# Patient Record
Sex: FEMALE | Race: WHITE | NOT HISPANIC OR LATINO | Employment: UNEMPLOYED | URBAN - METROPOLITAN AREA
[De-identification: names, ages, dates, MRNs, and addresses within clinical notes are randomized per-mention and may not be internally consistent; named-entity substitution may affect disease eponyms.]

---

## 2017-01-12 RX ORDER — METOPROLOL TARTRATE 50 MG/1
TABLET, FILM COATED ORAL
Qty: 90 TAB | Refills: 0 | Status: SHIPPED | OUTPATIENT
Start: 2017-01-12 | End: 2017-02-08 | Stop reason: SDUPTHER

## 2017-01-12 NOTE — TELEPHONE ENCOUNTER
Last seen: 05/17/16 by Dr. velazquez  Next appt: 01/13/17 with Dr. Hernández    Was the patient seen in the last year in this department? Yes   Does patient have an active prescription for medications requested? No   Received Request Via: Pharmacy

## 2017-01-13 ENCOUNTER — OFFICE VISIT (OUTPATIENT)
Dept: INTERNAL MEDICINE | Facility: MEDICAL CENTER | Age: 52
End: 2017-01-13
Payer: MEDICARE

## 2017-01-13 VITALS
OXYGEN SATURATION: 97 % | DIASTOLIC BLOOD PRESSURE: 82 MMHG | TEMPERATURE: 99 F | HEIGHT: 66 IN | HEART RATE: 52 BPM | BODY MASS INDEX: 25.65 KG/M2 | RESPIRATION RATE: 16 BRPM | SYSTOLIC BLOOD PRESSURE: 102 MMHG | WEIGHT: 159.6 LBS

## 2017-01-13 DIAGNOSIS — Z00.00 HEALTH CARE MAINTENANCE: ICD-10-CM

## 2017-01-13 DIAGNOSIS — I10 ESSENTIAL HYPERTENSION: ICD-10-CM

## 2017-01-13 DIAGNOSIS — M79.7 FIBROMYALGIA: ICD-10-CM

## 2017-01-13 DIAGNOSIS — Z72.0 TOBACCO ABUSE: ICD-10-CM

## 2017-01-13 DIAGNOSIS — M25.50 POLYARTHRALGIA: ICD-10-CM

## 2017-01-13 DIAGNOSIS — E78.00 HYPERCHOLESTEROLEMIA: ICD-10-CM

## 2017-01-13 PROCEDURE — 99213 OFFICE O/P EST LOW 20 MIN: CPT | Mod: GE | Performed by: INTERNAL MEDICINE

## 2017-01-13 RX ORDER — MELOXICAM 15 MG/1
TABLET ORAL
Refills: 2 | COMMUNITY
Start: 2017-01-04 | End: 2021-03-13

## 2017-01-13 NOTE — MR AVS SNAPSHOT
"        Cheyanne Lindsey   2017 8:15 AM   Office Visit   MRN: [id number]    Department:  [facility] Med - Internal Med   Dept Phone:  [phone]    Description:  Female : 1965   Provider:  Henrique Rose M.D.           Reason for Visit     Hyperlipidemia Dr. Pinzon pt- wishes lab order, off statin x 7 months     Hypertension           Allergies as of 2017     No Known Allergies      You were diagnosed with     Hypercholesterolemia   [790119]       Essential hypertension   [8741738]       Polyarthralgia   [964624]       Fibromyalgia   [080291]       Tobacco abuse   [541870]       Health care maintenance   [517741]         Vital Signs     Blood Pressure Pulse Temperature Respirations Height Weight    102/82 mmHg 52 37.2 °C (99 °F) 16 1.683 m (5' 6.26\") 72.394 kg (159 lb 9.6 oz)    Body Mass Index Oxygen Saturation Smoking Status             25.56 kg/m2 97% Current Every Day Smoker         Basic Information     Date Of Birth Sex Race Ethnicity Preferred Language    1965 Female White Non- English      Your appointments     2017  8:00 AM   Established Patient with Jayla Pinzon M.D.   [facility] Med - Internal Medicine (--)    [address] [place] [id number] [phone]           You will be receiving a confirmation call a few days before your appointment from our automated call confirmation system.              Problem List              ICD-10-CM Priority Class Noted - Resolved    Fibromyalgia M79.7   2016 - Present    Muscle cramp R25.2   2016 - Present    Blurred vision H53.8   2016 - Present    Palpitation R00.2   2016 - Present    URI (upper respiratory infection) J06.9   2016 - Present    BPPV (benign paroxysmal positional vertigo) H81.10   2016 - Present    Tobacco abuse Z72.0   2016 - Present    Abnormal vaginal Pap smear R87.629   2016 - Present    Lightheadedness R42   2016 - Present   " Hypercholesterolemia E78.00   5/19/2016 - Present    Chest pain R07.9   5/19/2016 - Present    Constipation K59.00   5/19/2016 - Present    Impaired fasting glucose R73.01   5/19/2016 - Present    Vaginal discharge N89.8   5/19/2016 - Present    Chronic low back pain M54.5, G89.29   5/19/2016 - Present    RLS (restless legs syndrome) G25.81   5/19/2016 - Present    Polyarthralgia M25.50   5/19/2016 - Present      Health Maintenance        Date Due Completion Dates    IMM DTaP/Tdap/Td Vaccine (1 - Tdap) 1/2/1983 ---    IMM PNEUMOCOCCAL 19-64 (ADULT) MEDIUM RISK SERIES (1 of 1 - PPSV23) 1/2/1983 ---    PAP SMEAR 1/2/1985 ---    MAMMOGRAM 2/4/2005 2/4/2004    COLONOSCOPY 1/2/2014 ---    IMM INFLUENZA (1) 9/1/2016 12/17/2015            Current Immunizations     Influenza Vaccine Quad Inj (Pf) 12/17/2015      Below and/or attached are the medications your provider expects you to take. Review all of your home medications and newly ordered medications with your provider and/or pharmacist. Follow medication instructions as directed by your provider and/or pharmacist. Please keep your medication list with you and share with your provider. Update the information when medications are discontinued, doses are changed, or new medications (including over-the-counter products) are added; and carry medication information at all times in the event of emergency situations     Allergies:  No Known Allergies          Medications  Valid as of: January 13, 2017 -  9:04 AM    Generic Name Brand Name Tablet Size Instructions for use    Acetaminophen (Tab) TYLENOL 500 MG Take 500-1,000 mg by mouth every 6 hours as needed.        Albuterol Sulfate (Aero Soln) albuterol 108 (90 BASE) MCG/ACT Inhale 2 Puffs by mouth every 6 hours as needed for Shortness of Breath.        Amoxicillin-Pot Clavulanate (Tab) AUGMENTIN 875-125 MG Take 1 Tab by mouth 2 times a day.        Atorvastatin Calcium (Tab) LIPITOR 40 MG Take 40 mg by mouth every evening.         Azithromycin (Tab) ZITHROMAX 250 MG 2 tablets by mouth on day one then one tablet by mouth daily for the next 4 days.        Benzonatate (Cap) TESSALON 100 MG Take 1 Cap by mouth 3 times a day as needed for Cough.        Ciprofloxacin-Dexamethasone (Suspension) CIPRODEX 0.3-0.1 % Place 4 Drops in ear 2 times a day.        Cyclobenzaprine HCl (Tab) FLEXERIL 5 MG Take 5-10 mg by mouth 3 times a day as needed.        GuaiFENesin (TABLET SR 12 HR) MUCINEX 600 MG Take 600 mg by mouth every 12 hours.        Guaifenesin-Codeine (Solution) ROBITUSSIN -10 mg/5mL Take 5 mL by mouth every four hours as needed for Cough.        Hydrocodone-Acetaminophen (Tab) NORCO 7.5-325 MG Take 1-2 Tabs by mouth every 6 hours as needed.        Hydrocodone-Acetaminophen (Tab) NORCO  MG Take 1-2 Tabs by mouth every 6 hours as needed.        Ibuprofen   Take 800 mg by mouth 3 times a day as needed. OTC        Meloxicam (Tab) MOBIC 15 MG TK 1 T PO QD        Metoprolol Tartrate (Tab) LOPRESSOR 50 MG TAKE 1 1/2 TABLETS BY MOUTH TWICE DAILY        Mupirocin (Ointment) BACTROBAN 2 % Apply  to affected area(s) every day.        .                 Medicines prescribed today were sent to:     [facility] [address] [place] [address] [address] [facility]    [address] [place] [id number]    Phone: [phone] Fax: [phone]    Open 24 Hours?: No      Medication refill instructions:       If your prescription bottle indicates you have medication refills left, it is not necessary to call your provider’s office. Please contact your pharmacy and they will refill your medication.    If your prescription bottle indicates you do not have any refills left, you may request refills at any time through one of the following ways: The online Slingjot system (except Urgent Care), by calling your provider’s office, or by asking your pharmacy to contact your provider’s office with a refill request. Medication refills are  processed only during regular business hours and may not be available until the next business day. Your provider may request additional information or to have a follow-up visit with you prior to refilling your medication.   *Please Note: Medication refills are assigned a new Rx number when refilled electronically. Your pharmacy may indicate that no refills were authorized even though a new prescription for the same medication is available at the pharmacy. Please request the medicine by name with the pharmacy before contacting your provider for a refill.        Your To Do List     Future Labs/Procedures Complete By Expires    ANTI-NUCLEAR ANTIBODY SERUM  As directed 1/13/2018    CBC WITHOUT DIFFERENTIAL  As directed 7/13/2017    COMP METABOLIC PANEL  As directed 1/13/2018    [id number]  As directed 1/13/2018    TSH WITH REFLEX TO FT4  As directed 1/13/2018      Referral     A referral request has been sent to our patient care coordination department. Please allow 3-5 business days for us to process this request and contact you either by phone or mail. If you do not hear from us by the 5th business day, please call us at [phone].        Instructions    - Get pneumovax 23 at pharmacy          [email or web address] Access Code: [email or web address]  Expires: 2/12/2017  9:04 AM    CompStak  A secure, online tool to manage your health information     [employer]’s CompStak® is a secure, online tool that connects you to your personalized health information from the privacy of your home -- day or night - making it very easy for you to manage your healthcare. Once the activation process is completed, you can even access your medical information using the CompStak kortney, which is available for free in the Apple Kortney store or Google Play store.     CompStak provides the following levels of access (as shown below):   My Chart Features   [facility] Doctor [facility]  Specialists [facility]  Urgent  Care Non-[facility]  Primary  Care  Doctor   Email your healthcare team securely and privately 24/7 X X X    Manage appointments: schedule your next appointment; view details of past/upcoming appointments X      Request prescription refills. X      View recent personal medical records, including lab and immunizations X X X X   View health record, including health history, allergies, medications X X X X   Read reports about your outpatient visits, procedures, consult and ER notes X X X X   See your discharge summary, which is a recap of your hospital and/or ER visit that includes your diagnosis, lab results, and care plan. X X       How to register for Anaergia:  1. Go to  [email or web address].  2. Click on the Sign Up Now box, which takes you to the New Member Sign Up page. You will need to provide the following information:  a. Enter your Anaergia Access Code exactly as it appears at the top of this page. (You will not need to use this code after you’ve completed the sign-up process. If you do not sign up before the expiration date, you must request a new code.)   b. Enter your date of birth.   c. Enter your home email address.   d. Click Submit, and follow the next screen’s instructions.  3. Create a Anaergia ID. This will be your Anaergia login ID and cannot be changed, so think of one that is secure and easy to remember.  4. Create a Anaergia password. You can change your password at any time.  5. Enter your Password Reset Question and Answer. This can be used at a later time if you forget your password.   6. Enter your e-mail address. This allows you to receive e-mail notifications when new information is available in Anaergia.  7. Click Sign Up. You can now view your health information.    For assistance activating your Anaergia account, call [phone]

## 2017-01-14 NOTE — PROGRESS NOTES
"Established Patient    Cheyanne presents today with the following:    CC: Off statin X 7 months requesting lipid panel, high BP    HPI: Miss Lindsey is a 52 year old female with a PMH of fibromyalgia, tobacco use, HLD, RLS, chronic low back pain, diffuse joint pain presents for an acute visit for elevated BP and is requesting a lipid panel. Roughly 7 months prior the patient was pulling weeds in her garden and using her hands a lot when she began to experience a worsening of her chronic hand pain. She was told by an acquaintance that this is a side effect of the statin that she was taking so she discontinued it. Patient is also worried about her blood pressure. Home reading have been running high lately reaching levels of 202/100. At the time that reading was taken she was in a significant amount of pain and also states that she had \"the flu\" with upper respiratory symptoms. She states that at present she is prescribed to take 1.5 pills of metoprolol, but has been taking 2 pills because of her elevated BP reading. She would also like the labs that were ordered for her in May reordered because she never had them performed. She is still in a significant amount of joint pain and body aches and is requesting a referral to a neurologist.    Patient Active Problem List    Diagnosis Date Noted   • Fibromyalgia 05/19/2016   • Muscle cramp 05/19/2016   • Blurred vision 05/19/2016   • Palpitation 05/19/2016   • URI (upper respiratory infection) 05/19/2016   • BPPV (benign paroxysmal positional vertigo) 05/19/2016   • Tobacco abuse 05/19/2016   • Abnormal vaginal Pap smear 05/19/2016   • Lightheadedness 05/19/2016   • Hypercholesterolemia 05/19/2016   • Chest pain 05/19/2016   • Constipation 05/19/2016   • Impaired fasting glucose 05/19/2016   • Vaginal discharge 05/19/2016   • Chronic low back pain 05/19/2016   • RLS (restless legs syndrome) 05/19/2016   • Polyarthralgia 05/19/2016       Current Outpatient Prescriptions "   Medication Sig Dispense Refill   • metoprolol (LOPRESSOR) 50 MG Tab TAKE 1 1/2 TABLETS BY MOUTH TWICE DAILY 90 Tab 0   • hydrocodone/acetaminophen (NORCO)  MG Tab Take 1-2 Tabs by mouth every 6 hours as needed.     • cyclobenzaprine (FLEXERIL) 5 MG tablet Take 5-10 mg by mouth 3 times a day as needed.     • atorvastatin (LIPITOR) 40 MG Tab Take 40 mg by mouth every evening.     • acetaminophen (TYLENOL) 500 MG Tab Take 500-1,000 mg by mouth every 6 hours as needed.     • meloxicam (MOBIC) 15 MG tablet TK 1 T PO QD  2   • ciprofloxacin/dexamethasone (CIPRODEX) 0.3-0.1 % Suspension Place 4 Drops in ear 2 times a day.     • mupirocin (BACTROBAN) 2 % Ointment Apply  to affected area(s) every day.     • guaifenesin LA (MUCINEX) 600 MG TABLET SR 12 HR Take 600 mg by mouth every 12 hours.     • hydrocodone-acetaminophen (NORCO) 7.5-325 MG per tablet Take 1-2 Tabs by mouth every 6 hours as needed.     • amoxicillin-clavulanate (AUGMENTIN) 875-125 MG Tab Take 1 Tab by mouth 2 times a day. (Patient not taking: Reported on 5/17/2016) 20 Tab 0   • guaifenesin-codeine (ROBITUSSIN AC) Solution oral solution Take 5 mL by mouth every four hours as needed for Cough. (Patient not taking: Reported on 5/17/2016) 140 mL 0   • azithromycin (ZITHROMAX Z-SHARAD) 250 MG Tab 2 tablets by mouth on day one then one tablet by mouth daily for the next 4 days. (Patient not taking: Reported on 5/17/2016) 6 Tab 0   • benzonatate (TESSALON) 100 MG Cap Take 1 Cap by mouth 3 times a day as needed for Cough. (Patient not taking: Reported on 5/17/2016) 20 Cap 0   • albuterol (VENTOLIN OR PROVENTIL) 108 (90 BASE) MCG/ACT AERS Inhale 2 Puffs by mouth every 6 hours as needed for Shortness of Breath. 1 Inhaler 1   • IBUPROFEN IB PO Take 800 mg by mouth 3 times a day as needed. OTC       No current facility-administered medications for this visit.       ROS: As per HPI. Additional pertinent symptoms as noted below.        /82 mmHg  Pulse 52   "Temp([facility]) 37.2 °C (99 °F)  Resp 16  Ht 1.683 m (5' 6.26\")  Wt 72.394 kg (159 lb 9.6 oz)  BMI 25.56 kg/m2  SpO2 97%    Physical Exam   Constitutional:  oriented to person, place, and time. No distress.    Eyes: Pupils are equal, round, and reactive to light. No scleral icterus.    Neck: Neck supple. No thyromegaly present.    Cardiovascular: Normal rate, regular rhythm and normal heart sounds.  Exam reveals no gallop and no friction rub.     No murmur heard.  Pulmonary/Chest: Breath sounds normal. No wheezes, crackles, rhales. Chest wall is not dull to percussion.    Musculoskeletal:   no edema. No thenar atrophy  Lymphadenopathy: no cervical adenopathy  Neurological: alert and oriented to person, place, and time. CN 2-12 intact. DTR WNL.   Skin: No cyanosis. Nails show no clubbing.        Assessment and Plan    1. Hypercholesterolemia   - Reassured patient that although myositis and myalgia is a side effect of statin's, it is unlikely that it would only affect her hands   - Off statin X 7 months   - Repeat lipid panel   - May restart statin based on lab findings    2. Essential hypertension   - /100 at home according to patient   - /82, pulse 52 at today's visit. Denies lightheadedness, dizziness, LOC, weakness, fatigue at todays visit.   - Patient increased her metoprolol dose without physician knowledge   - Instructed to take her metoprolol as directed on bottle   - Given BP log. Asked to log BP with home cuff once per day and bring it in during her next office visit   - She was also asked to bring in her BP cuff    3. Polyarthralgia   - Rheumatoid factor negative in 2010   - Rh factor was again ordered this past May, but patient did not have it drawn   - No need to repeat lab at this visit   - She has been followed by a rheumatologist in the past   - AMBROCIO re-ordered    4. Fibromyalgia/Chronic pain   - Seen and managed at pain clinic who is prescribing her narcotics   - Requesting neurology consult " for hyperalgesia. Consult placed.    5. Tobacco abuse   - Counseled cessation   - Follow up at next visit    6. Health care maintenance   - Mammogram ordered   - Recommened PPSV23 at todays visit. She wanted to discuss it with her PCP.    PLAN: CBC, CMP, TSH, lipid panel, AMBROCIO, mammogram ordered. F/u with PCP, Dr. Pinzon on 2/24/2017.    Signed by: Henrique Rose M.D.

## 2017-02-09 RX ORDER — METOPROLOL TARTRATE 50 MG/1
TABLET, FILM COATED ORAL
Qty: 90 TAB | Refills: 0 | Status: SHIPPED | OUTPATIENT
Start: 2017-02-09 | End: 2017-03-20 | Stop reason: SDUPTHER

## 2017-02-09 NOTE — TELEPHONE ENCOUNTER
Last seen: 01/13/17 by Dr. Rose  Next appt: 02/24/17 with Dr. Pinzon    Was the patient seen in the last year in this department? Yes   Does patient have an active prescription for medications requested? No   Received Request Via: Pharmacy

## 2017-02-22 LAB
ALBUMIN SERPL-MCNC: 4.3 G/DL (ref 3.5–5.5)
ALBUMIN/GLOB SERPL: 1.9 {RATIO} (ref 1.1–2.5)
ALP SERPL-CCNC: 64 IU/L (ref 39–117)
ALT SERPL-CCNC: 15 IU/L (ref 0–32)
ANA HOMOGEN TITR SER: NORMAL {TITER}
ANA INTERCELL BRIDGE TITR SER IF: NORMAL {TITER}
ANA NUCLEAR DOTS TITR SER IF: NORMAL {TITER}
ANA NUCLEAR MEMBRANE PORES TITR SER IF: NORMAL {TITER}
ANA NUCLEOLAR TITR SER: NORMAL {TITER}
ANA SPECKLED TITR SER: NORMAL {TITER}
ANA TITR SER IF: POSITIVE {TITER}
AST SERPL-CCNC: 21 IU/L (ref 0–40)
BILIRUB SERPL-MCNC: 0.3 MG/DL (ref 0–1.2)
BUN SERPL-MCNC: 9 MG/DL (ref 6–24)
BUN/CREAT SERPL: 17 (ref 9–23)
CALCIUM SERPL-MCNC: 9.8 MG/DL (ref 8.7–10.2)
CENTROMERE AB TITR SER IF: NORMAL {TITER}
CHLORIDE SERPL-SCNC: 103 MMOL/L (ref 96–106)
CO2 SERPL-SCNC: 22 MMOL/L (ref 18–29)
CREAT SERPL-MCNC: 0.53 MG/DL (ref 0.57–1)
DEPRECATED CENTRIOLE AB TITR SER IF: NORMAL {TITER}
ENA PCNA AB TITR SER IF: NORMAL {TITER}
ERYTHROCYTE [DISTWIDTH] IN BLOOD BY AUTOMATED COUNT: 13.1 % (ref 12.3–15.4)
GLOBULIN SER CALC-MCNC: 2.3 G/DL (ref 1.5–4.5)
GLUCOSE SERPL-MCNC: 118 MG/DL (ref 65–99)
HCT VFR BLD AUTO: 46.2 % (ref 34–46.6)
HGB BLD-MCNC: 15.7 G/DL (ref 11.1–15.9)
MCH RBC QN AUTO: 32.2 PG (ref 26.6–33)
MCHC RBC AUTO-ENTMCNC: 34 G/DL (ref 31.5–35.7)
MCV RBC AUTO: 95 FL (ref 79–97)
MITOTIC SPINDLE APP AB TITR SERPL IF: NORMAL {TITER}
NOTE 016271: NORMAL
NRBC BLD AUTO-RTO: NORMAL %
PLATELET # BLD AUTO: 283 X10E3/UL (ref 150–379)
POTASSIUM SERPL-SCNC: 4.4 MMOL/L (ref 3.5–5.2)
PROT SERPL-MCNC: 6.6 G/DL (ref 6–8.5)
RBC # BLD AUTO: 4.87 X10E6/UL (ref 3.77–5.28)
SODIUM SERPL-SCNC: 144 MMOL/L (ref 134–144)
TSH SERPL DL<=0.005 MIU/L-ACNC: 1.94 UIU/ML (ref 0.45–4.5)
WBC # BLD AUTO: 9.1 X10E3/UL (ref 3.4–10.8)

## 2017-02-24 ENCOUNTER — OFFICE VISIT (OUTPATIENT)
Dept: INTERNAL MEDICINE | Facility: MEDICAL CENTER | Age: 52
End: 2017-02-24
Payer: MEDICARE

## 2017-02-24 VITALS
SYSTOLIC BLOOD PRESSURE: 136 MMHG | BODY MASS INDEX: 24.75 KG/M2 | DIASTOLIC BLOOD PRESSURE: 82 MMHG | HEIGHT: 66 IN | HEART RATE: 73 BPM | OXYGEN SATURATION: 95 % | WEIGHT: 154 LBS | TEMPERATURE: 98.4 F

## 2017-02-24 DIAGNOSIS — G89.29 CHRONIC MIDLINE LOW BACK PAIN, WITH SCIATICA PRESENCE UNSPECIFIED: ICD-10-CM

## 2017-02-24 DIAGNOSIS — Z00.00 HEALTHCARE MAINTENANCE: ICD-10-CM

## 2017-02-24 DIAGNOSIS — E78.00 HYPERCHOLESTEROLEMIA: ICD-10-CM

## 2017-02-24 DIAGNOSIS — M25.50 POLYARTHRALGIA: ICD-10-CM

## 2017-02-24 DIAGNOSIS — Z72.0 TOBACCO ABUSE: ICD-10-CM

## 2017-02-24 DIAGNOSIS — M79.7 FIBROMYALGIA: ICD-10-CM

## 2017-02-24 DIAGNOSIS — F32.0 MILD SINGLE CURRENT EPISODE OF MAJOR DEPRESSIVE DISORDER (HCC): ICD-10-CM

## 2017-02-24 DIAGNOSIS — M79.10 MUSCLE PAIN: ICD-10-CM

## 2017-02-24 DIAGNOSIS — J01.01 ACUTE RECURRENT MAXILLARY SINUSITIS: ICD-10-CM

## 2017-02-24 DIAGNOSIS — M54.5 CHRONIC MIDLINE LOW BACK PAIN, WITH SCIATICA PRESENCE UNSPECIFIED: ICD-10-CM

## 2017-02-24 PROCEDURE — 99214 OFFICE O/P EST MOD 30 MIN: CPT | Mod: GC | Performed by: INTERNAL MEDICINE

## 2017-02-24 RX ORDER — GABAPENTIN 300 MG/1
CAPSULE ORAL
COMMUNITY
Start: 2017-02-07 | End: 2017-02-24

## 2017-02-24 RX ORDER — DULOXETIN HYDROCHLORIDE 30 MG/1
30 CAPSULE, DELAYED RELEASE ORAL DAILY
Qty: 30 CAP | Refills: 6 | Status: SHIPPED | OUTPATIENT
Start: 2017-02-24 | End: 2017-03-31 | Stop reason: SDUPTHER

## 2017-02-24 RX ORDER — DOXYCYCLINE HYCLATE 100 MG
100 TABLET ORAL 2 TIMES DAILY
Qty: 10 TAB | Refills: 0 | Status: SHIPPED | OUTPATIENT
Start: 2017-02-24 | End: 2017-03-31

## 2017-02-24 RX ORDER — ZOLPIDEM TARTRATE 10 MG/1
TABLET ORAL
COMMUNITY
Start: 2017-02-07 | End: 2017-02-24

## 2017-02-24 ASSESSMENT — ENCOUNTER SYMPTOMS
CHILLS: 1
BLOOD IN STOOL: 0
COUGH: 1
SORE THROAT: 1
FEVER: 1
EYE REDNESS: 1
BACK PAIN: 1
HEARTBURN: 0
INSOMNIA: 1
DEPRESSION: 1
SHORTNESS OF BREATH: 0
DIZZINESS: 0
TREMORS: 0
ABDOMINAL PAIN: 0
MYALGIAS: 1
EYE PAIN: 0
SPUTUM PRODUCTION: 1
BLURRED VISION: 0

## 2017-02-24 ASSESSMENT — PATIENT HEALTH QUESTIONNAIRE - PHQ9
5. POOR APPETITE OR OVEREATING: 0 - NOT AT ALL
SUM OF ALL RESPONSES TO PHQ QUESTIONS 1-9: 14
CLINICAL INTERPRETATION OF PHQ2 SCORE: 4

## 2017-02-24 NOTE — PATIENT INSTRUCTIONS
Follow up in 5 months  Start taking doxycycline and cymbalta  Labs  For sinusitis- OTC mucinex and nasonex

## 2017-02-24 NOTE — MR AVS SNAPSHOT
"        Cheyanne Lindsey   2017 8:00 AM   Office Visit   MRN: [id number]    Department:  [facility] Med - Internal Med   Dept Phone:  [phone]    Description:  Female : 1965   Provider:  Jayla Pinzon M.D.           Reason for Visit     Results Labs     Knee Swelling Wants x-ray     URI           Allergies as of 2017     No Known Allergies      You were diagnosed with     Chronic midline low back pain, with sciatica presence unspecified   [7236039]       Fibromyalgia   [409075]       Hypercholesterolemia   [684690]       Polyarthralgia   [326759]       Tobacco abuse   [475645]       Acute recurrent maxillary sinusitis   [382754]       Muscle pain   [839337]       Healthcare maintenance   [231603]       Mild single current episode of major depressive disorder (CMS-[facility])   [2759671]         Vital Signs     Blood Pressure Pulse Temperature Height Weight Body Mass Index    136/82 mmHg 73 36.9 °C (98.4 °F) 1.676 m (5' 6\") 69.854 kg (154 lb) 24.87 kg/m2    Oxygen Saturation Smoking Status                95% Current Every Day Smoker          Basic Information     Date Of Birth Sex Race Ethnicity Preferred Language    1965 Female White Non- English      Your appointments     Mar 06, 2017  2:10 PM   MA SCRN10 with RB MG 3   [facility] ([address])    [address]  [facility] [id number] [phone]           No deodorant, powder, perfume or lotion under the arm or breast area.            Mar 31, 2017  8:30 AM   Established Patient with MARIA LUISA Pacheco / [facility] Med - Internal Medicine (--)    [address] [place] [address]  [address]  [facility] [address] [phone]           You will be receiving a confirmation call a few days before your appointment from our automated call confirmation system.            2017 11:00 AM   [facility] ([facility]    [address]  [place] [address]   [phone]           Please bring Photo ID, Insurance Cards, All Medication Bottles and copies of any legal documents (such as Living Will, Power of ) If speaking a language besides English please bring an adult . Please arrive 30 minutes prior for check in and registration. You will be receiving a confirmation call a few days before your appointment from our automated call confirmation system.              Problem List              ICD-10-CM Priority Class Noted - Resolved    Fibromyalgia M79.7   5/19/2016 - Present    Muscle cramp R25.2   5/19/2016 - Present    Blurred vision H53.8   5/19/2016 - Present    Palpitation R00.2   5/19/2016 - Present    URI (upper respiratory infection) J06.9   5/19/2016 - Present    BPPV (benign paroxysmal positional vertigo) H81.10   5/19/2016 - Present    Tobacco abuse Z72.0   5/19/2016 - Present    Abnormal vaginal Pap smear R87.629   5/19/2016 - Present    Lightheadedness R42   5/19/2016 - Present    Hypercholesterolemia E78.00   5/19/2016 - Present    Chest pain R07.9   5/19/2016 - Present    Constipation K59.00   5/19/2016 - Present    Impaired fasting glucose R73.01   5/19/2016 - Present    Vaginal discharge N89.8   5/19/2016 - Present    Chronic low back pain M54.5, G89.29   5/19/2016 - Present    RLS (restless legs syndrome) G25.81   5/19/2016 - Present    Polyarthralgia M25.50   5/19/2016 - Present      Health Maintenance        Date Due Completion Dates    IMM DTaP/Tdap/Td Vaccine (1 - Tdap) 1/2/1983 ---    IMM PNEUMOCOCCAL 19-64 (ADULT) MEDIUM RISK SERIES (1 of 1 - PPSV23) 1/2/1983 ---    PAP SMEAR 1/2/1985 ---    MAMMOGRAM 2/4/2005 2/4/2004    IMM INFLUENZA (1) 9/1/2016 12/17/2015    COLONOSCOPY 12/15/2024 12/15/2014            Current Immunizations     Influenza Vaccine Quad Inj (Pf) 12/17/2015      Below and/or attached are the medications your provider expects you to take. Review all of your home medications and newly  ordered medications with your provider and/or pharmacist. Follow medication instructions as directed by your provider and/or pharmacist. Please keep your medication list with you and share with your provider. Update the information when medications are discontinued, doses are changed, or new medications (including over-the-counter products) are added; and carry medication information at all times in the event of emergency situations     Allergies:  No Known Allergies          Medications  Valid as of: February 24, 2017 - 10:23 AM    Generic Name Brand Name Tablet Size Instructions for use    Acetaminophen (Tab) TYLENOL 500 MG Take 500-1,000 mg by mouth every 6 hours as needed.        Albuterol Sulfate (Aero Soln) albuterol 108 (90 BASE) MCG/ACT Inhale 2 Puffs by mouth every 6 hours as needed for Shortness of Breath.        Amoxicillin-Pot Clavulanate (Tab) AUGMENTIN 875-125 MG Take 1 Tab by mouth 2 times a day.        Atorvastatin Calcium (Tab) LIPITOR 40 MG Take 40 mg by mouth every evening.        Azithromycin (Tab) ZITHROMAX 250 MG 2 tablets by mouth on day one then one tablet by mouth daily for the next 4 days.        Benzonatate (Cap) TESSALON 100 MG Take 1 Cap by mouth 3 times a day as needed for Cough.        Ciprofloxacin-Dexamethasone (Suspension) CIPRODEX 0.3-0.1 % Place 4 Drops in ear 2 times a day.        Cyclobenzaprine HCl (Tab) FLEXERIL 5 MG Take 5-10 mg by mouth 3 times a day as needed.        Doxycycline Hyclate (Tab) VIBRAMYCIN 100 MG Take 1 Tab by mouth 2 times a day.        DULoxetine HCl (Cap DR Particles) CYMBALTA 30 MG Take 1 Cap by mouth every day.        Gabapentin (Cap) NEURONTIN 300 MG         GuaiFENesin (TABLET SR 12 HR) MUCINEX 600 MG Take 600 mg by mouth every 12 hours.        Guaifenesin-Codeine (Solution) ROBITUSSIN -10 mg/5mL Take 5 mL by mouth every four hours as needed for Cough.        Hydrocodone-Acetaminophen (Tab) NORCO 7.5-325 MG Take 1-2 Tabs by mouth every 6 hours as  needed.        Hydrocodone-Acetaminophen (Tab) NORCO  MG Take 1-2 Tabs by mouth every 6 hours as needed.        Ibuprofen   Take 800 mg by mouth 3 times a day as needed. OTC        Meloxicam (Tab) MOBIC 15 MG TK 1 T PO QD        Metoprolol Tartrate (Tab) LOPRESSOR 50 MG TAKE 1 1/2 TABLETS BY MOUTH TWICE DAILY        Mupirocin (Ointment) BACTROBAN 2 % Apply  to affected area(s) every day.        Varenicline Tartrate (Misc) CHANTIX SHARAD 0.5 MG X 11 & 1 MG X 42 Chantix starter packet.  Per pharmacy instruction.        Zolpidem Tartrate (Tab) AMBIEN 10 MG         .                 Medicines prescribed today were sent to:     [id number]  [place], [place] - [address]    [address] [place] [address]    Phone: [phone] Fax: [phone]    Open 24 Hours?: No      Medication refill instructions:       If your prescription bottle indicates you have medication refills left, it is not necessary to call your provider’s office. Please contact your pharmacy and they will refill your medication.    If your prescription bottle indicates you do not have any refills left, you may request refills at any time through one of the following ways: The online [employer] system (except Urgent Care), by calling your provider’s office, or by asking your pharmacy to contact your provider’s office with a refill request. Medication refills are processed only during regular business hours and may not be available until the next business day. Your provider may request additional information or to have a follow-up visit with you prior to refilling your medication.   *Please Note: Medication refills are assigned a new Rx number when refilled electronically. Your pharmacy may indicate that no refills were authorized even though a new prescription for the same medication is available at the pharmacy. Please request the medicine by name with the pharmacy before contacting your provider for a  refill.        Your To Do List     Future Labs/Procedures Complete By Expires    CCP-CYCLIC CITRULLINATED PEPTID  As directed 2/24/2018    CREATINE KINASE  As directed 2/24/2018    CRP QUANTITIVE (NON-CARDIAC)  As directed 2/25/2018    LIPID PROFILE  As directed 2/24/2018    RHEUMATOID ARTHRITIS FACTOR  As directed 2/24/2018    WESTERGREN SED RATE  As directed 2/25/2018      Referral     A referral request has been sent to our patient care coordination department. Please allow 3-5 business days for us to process this request and contact you either by phone or mail. If you do not hear from us by the 5th business day, please call us at [phone].        Instructions    Follow up in 5 months  Start taking doxycycline and cymbalta  Labs  For sinusitis- OTC mucinex and nasonex          Jump On It Access Code: [email or web address]  Expires: 3/26/2017 10:23 AM    Jump On It  A secure, online tool to manage your health information     [employer]’s Jump On It® is a secure, online tool that connects you to your personalized health information from the privacy of your home -- day or night - making it very easy for you to manage your healthcare. Once the activation process is completed, you can even access your medical information using the Jump On It kortney, which is available for free in the Apple Kortney store or Google Play store.     Jump On It provides the following levels of access (as shown below):   My Chart Features   [facility] Primary Care Doctor [facility]  Specialists [facility]  Urgent  Care Non-[facility]  Primary Care  Doctor   Email your healthcare team securely and privately 24/7 X X X    Manage appointments: schedule your next appointment; view details of past/upcoming appointments X      Request prescription refills. X      View recent personal medical records, including lab and immunizations X X X X   View health record, including health history, allergies, medications X X X X   Read reports about your outpatient visits, procedures,  consult and ER notes X X X X   See your discharge summary, which is a recap of your hospital and/or ER visit that includes your diagnosis, lab results, and care plan. X X       How to register for [employer]:  1. Go to  [email or web address].  2. Click on the Sign Up Now box, which takes you to the New Member Sign Up page. You will need to provide the following information:  a. Enter your Regaalo Access Code exactly as it appears at the top of this page. (You will not need to use this code after you’ve completed the sign-up process. If you do not sign up before the expiration date, you must request a new code.)   b. Enter your date of birth.   c. Enter your home email address.   d. Click Submit, and follow the next screen’s instructions.  3. Create a DeliveryChef.int ID. This will be your DeliveryChef.int login ID and cannot be changed, so think of one that is secure and easy to remember.  4. Create a DeliveryChef.int password. You can change your password at any time.  5. Enter your Password Reset Question and Answer. This can be used at a later time if you forget your password.   6. Enter your e-mail address. This allows you to receive e-mail notifications when new information is available in Regaalo.  7. Click Sign Up. You can now view your health information.    For assistance activating your Regaalo account, call [phone]

## 2017-02-24 NOTE — PROGRESS NOTES
Established Patient    Cheyanne presents today with the following:    CC: Facial pain, productive cough, multiple joint pain    HPI:      HTN- Home BP reading 120-158.  Mostly in -130's.  In office /82. .  Last time when she was in office her BP was borderline low.  At that time, she was taking additional doses of her Metoprolol as her home BP was 200's/100's.   She brought her BP for calibration.   Her home BP reading is 10mmHg higher than our reading.     Maxillary sinusitis- She c/o Left sided facial pain and pressure.  She also has ear pressure and nasal congestion and greenish nasal discharge.   Productive cough with green sputum.  ROS+ Subjective fever.  But, never checked her temperature.  Denies chest pain or shortness of breath.   She is a smoker.     Hypercholesterolemia - She stopped taking statin due to muscle pain.  Though she had muscle pain and fibromyalgia for years.  After discontinuation of statin, she still has waxing and waning muscle aches.     Depression-  Patient c/o she has had depressive symptoms for sometime.  She has trouble sleeping, feeling tired and loss energy, trouble concentrating, and has little interest in doing activities she used to enjoy.  She denies SI and HI    Fibromyalgia- Managed by pain clinic. On Gabapentin.  She c/o her whole body hurts.      Polyarthralgia-  She c/o whole body and all joint swelling.  She can't pin point where she gets swelling.  She states it swells all over not just her joints.  Also c/o whole body aches.   Morning stiffness lasting about 30 minutes to all day.    Patient was evaluated by rheumatologist in the past and was diagnosed with Fibromyalgia.  She insisted on seeing rheumatologist for further evaluation as she feels that it's not just fibromyalgia.     Chronic low back pain - Followed by ortho and pain clinic.  On Norco, Meloxicam and gabapentin.  Had epidural yesterday.      Tobacco abuse -quit for 5 yrs with hypnosis.   However, she stopped smoking again.  Her smoking is contributing to recurrent sinusitis.  She was counseled about smoking cessation.  She agreed to start chantix    Rash- non healing rash above her upper lips.  She states it was red for 3 months. Now it is more scaly.      Healthcare maintenance  -Colonoscopy UTD. Wnl  -Pap- Last pap about 2 yrs ago.  H/o abnormal pap smear.  Followed by Ob/gyn          Patient Active Problem List    Diagnosis Date Noted   • Fibromyalgia 05/19/2016   • Muscle cramp 05/19/2016   • Blurred vision 05/19/2016   • Palpitation 05/19/2016   • URI (upper respiratory infection) 05/19/2016   • BPPV (benign paroxysmal positional vertigo) 05/19/2016   • Tobacco abuse 05/19/2016   • Abnormal vaginal Pap smear 05/19/2016   • Lightheadedness 05/19/2016   • Hypercholesterolemia 05/19/2016   • Chest pain 05/19/2016   • Constipation 05/19/2016   • Impaired fasting glucose 05/19/2016   • Vaginal discharge 05/19/2016   • Chronic low back pain 05/19/2016   • RLS (restless legs syndrome) 05/19/2016   • Polyarthralgia 05/19/2016       Current Outpatient Prescriptions   Medication Sig Dispense Refill   • doxycycline (VIBRAMYCIN) 100 MG Tab Take 1 Tab by mouth 2 times a day. 10 Tab 0   • varenicline (CHANTIX SHARAD) 0.5 MG X 11 & 1 MG X 42 tablet Chantix starter packet.  Per pharmacy instruction. 1 Tab 0   • duloxetine (CYMBALTA) 30 MG Cap DR Particles Take 1 Cap by mouth every day. 30 Cap 6   • metoprolol (LOPRESSOR) 50 MG Tab TAKE 1 1/2 TABLETS BY MOUTH TWICE DAILY 90 Tab 0   • meloxicam (MOBIC) 15 MG tablet TK 1 T PO QD  2   • hydrocodone/acetaminophen (NORCO)  MG Tab Take 1-2 Tabs by mouth every 6 hours as needed.     • cyclobenzaprine (FLEXERIL) 5 MG tablet Take 5-10 mg by mouth 3 times a day as needed.     • acetaminophen (TYLENOL) 500 MG Tab Take 500-1,000 mg by mouth every 6 hours as needed.     • gabapentin (NEURONTIN) 300 MG Cap      • zolpidem (AMBIEN) 10 MG Tab      •  ciprofloxacin/dexamethasone (CIPRODEX) 0.3-0.1 % Suspension Place 4 Drops in ear 2 times a day.     • mupirocin (BACTROBAN) 2 % Ointment Apply  to affected area(s) every day.     • guaifenesin LA (MUCINEX) 600 MG TABLET SR 12 HR Take 600 mg by mouth every 12 hours.     • atorvastatin (LIPITOR) 40 MG Tab Take 40 mg by mouth every evening.     • hydrocodone-acetaminophen (NORCO) 7.5-325 MG per tablet Take 1-2 Tabs by mouth every 6 hours as needed.     • amoxicillin-clavulanate (AUGMENTIN) 875-125 MG Tab Take 1 Tab by mouth 2 times a day. (Patient not taking: Reported on 5/17/2016) 20 Tab 0   • guaifenesin-codeine (ROBITUSSIN AC) Solution oral solution Take 5 mL by mouth every four hours as needed for Cough. (Patient not taking: Reported on 5/17/2016) 140 mL 0   • azithromycin (ZITHROMAX Z-SHARAD) 250 MG Tab 2 tablets by mouth on day one then one tablet by mouth daily for the next 4 days. (Patient not taking: Reported on 5/17/2016) 6 Tab 0   • benzonatate (TESSALON) 100 MG Cap Take 1 Cap by mouth 3 times a day as needed for Cough. (Patient not taking: Reported on 5/17/2016) 20 Cap 0   • albuterol (VENTOLIN OR PROVENTIL) 108 (90 BASE) MCG/ACT AERS Inhale 2 Puffs by mouth every 6 hours as needed for Shortness of Breath. 1 Inhaler 1   • IBUPROFEN IB PO Take 800 mg by mouth 3 times a day as needed. OTC       No current facility-administered medications for this visit.       ROS: As per HPI. Additional pertinent symptoms as noted below.      Review of Systems   Constitutional: Positive for fever and chills.   HENT: Positive for congestion and sore throat. Negative for ear pain.         Ear pressure   Eyes: Positive for redness. Negative for blurred vision and pain.        Eye tearing   Respiratory: Positive for cough and sputum production. Negative for shortness of breath.    Cardiovascular: Negative for chest pain.   Gastrointestinal: Negative for heartburn, abdominal pain and blood in stool.   Genitourinary: Negative.     "  Musculoskeletal: Positive for myalgias, back pain and joint pain.   Skin: Positive for rash. Negative for itching.   Neurological: Negative for dizziness and tremors.   Endo/Heme/Allergies: Negative.    Psychiatric/Behavioral: Positive for depression. Negative for suicidal ideas. The patient has insomnia.        /82 mmHg  Pulse 73  Temp(Src) 36.9 °C (98.4 °F)  Ht 1.676 m (5' 6\")  Wt 69.854 kg (154 lb)  BMI 24.87 kg/m2  SpO2 95%    Physical Exam   Constitutional:  oriented to person, place, and time. No distress.   Eyes: Pupils are equal, round, and reactive to light. No scleral icterus.  No purulent discharge.   Neck: Neck supple. No thyromegaly present.   Face: Tenderness over the Left maxillary sinus upon light palpation.    Ears: TM clear non bulging.   Cardiovascular: Normal rate, regular rhythm and normal heart sounds.  Exam reveals no gallop and no friction rub.  No murmur heard.  Pulmonary/Chest: Breath sounds normal. Chest wall is not dull to percussion.   Musculoskeletal:   R. Knee: Full ROM.  Pain on ROM.  No erythema.  No swelling but appears bigger than the left knee.    B/l Hands: No sign of tenosynovitis on DIP, PIP, MCP joints b/l.    Abdomen:  Soft, BS+, Non tender, non distended  Lymphadenopathy: no cervical adenopathy  Neurological: alert and oriented to person, place, and time.   Skin: Scaly rash above upper lip.        Assessment and Plan    1. Chronic midline low back pain, with sciatica presence unspecified  -Patient has chronic low back pain.  She was on narcotics in the past.  She was off of narcotics for sometime  -Started [place] by her pain clinic again due to pain.  -She is also on gabapentin and meloxicam  -will defer to pain clinic and ortho  -CTM    2. Fibromyalgia  -On gabapentin.  Managed by pain clinic  -She displays depressive symptoms which is c/w diagnosis of major depression  -Given her depression, will start Cymbalta which would also help her Fibromyalgia.   - " duloxetine (CYMBALTA) 30 MG Cap DR Particles; Take 1 Cap by mouth every day.  Dispense: 30 Cap; Refill: 6    3. Hypercholesterolemia  -Patient self discontinued statin about 8 months ago due to muscle aches and body pain.     -She still has symptoms after d/c-ng statin.   -Her pain is likely due to fibromyalgia rather than statin induced rhabdomyolysis.   -Will get CPK.  Her AST ALT wnl.    -Will re-introduce statin if CPK is wnl  - LIPID PROFILE; Future    4. Polyarthralgia  -Polyarthrralgia.  Though she complains of multiple joint and whole body swelling and morning stiffness, there is no sign of Tenosynovitis.   -She requests referral for Rheumatology  -Rheumatology referral was made  -Will get following labs.   - CCP-CYCLIC CITRULLINATED PEPTID; Future  - RHEUMATOID ARTHRITIS FACTOR; Future  - WESTERGREN SED RATE; Future  - CRP QUANTITIVE (NON-CARDIAC); Future  - REFERRAL TO RHEUMATOLOGY    5. Tobacco abuse  -Counseled extensively about smoking cessation.  Patient agreed to start chantix  - varenicline (CHANTIX SHARAD) 0.5 MG X 11 & 1 MG X 42 tablet; Chantix starter packet.  Per pharmacy instruction.  Dispense: 1 Tab; Refill: 0    6. Acute recurrent maxillary sinusitis  -Facial tenderness, purulent nasal discharge and productive cough and nasal congestion  -Had Sinusitis 1 month ago and was treated with Augmentin  -To avoid resistance, will start Doxycycline  - doxycycline (VIBRAMYCIN) 100 MG Tab; Take 1 Tab by mouth 2 times a day.  Dispense: 10 Tab; Refill: 0    7. Muscle pain  -Likely due to Fibromyalgia.   -She self discontinued statin.  Will get CPK  - CREATINE KINASE; Future    8. Healthcare maintenance  -Colonoscopy UTD. Wnl  -Pap- Last pap about 2 yrs ago.  H/o abnormal pap smear.  Followed by Ob/gyn      9. Mild single current episode of major depressive disorder (CMS-HCC)  -Depression. Denies SI.  Will start Cymbalta  - duloxetine (CYMBALTA) 30 MG Cap DR Particles; Take 1 Cap by mouth every day.  Dispense:  30 Cap; Refill: 6    10. Rash  -Scaly rash above upper lip. Non healing.  Patient requests Dermatology referral  -Will refer to Derm for cancer screening.   - REFERRAL TO DERMATOLOGY      Followup: Return in about 5 weeks (around 3/31/2017).      Signed by: Jayla Pinzon M.D.

## 2017-03-20 RX ORDER — METOPROLOL TARTRATE 50 MG/1
TABLET, FILM COATED ORAL
Qty: 90 TAB | Refills: 0 | Status: SHIPPED | OUTPATIENT
Start: 2017-03-20 | End: 2017-04-17 | Stop reason: SDUPTHER

## 2017-03-21 ENCOUNTER — TELEPHONE (OUTPATIENT)
Dept: INTERNAL MEDICINE | Facility: MEDICAL CENTER | Age: 52
End: 2017-03-21

## 2017-03-21 NOTE — TELEPHONE ENCOUNTER
Pharmacy Name: [facility]    Pharmacy Phone#: [phone]    Pharmacy Fax#: [phone]    Request received via: fax    Message: requesting refill on Atorvastatin 40mg take 1 tab po qhs

## 2017-03-30 NOTE — TELEPHONE ENCOUNTER
Last seen: 02/24/17 by Dr. Pinzon  Next appt: 03/31/17 with Dr. Pinzon    Was the patient seen in the last year in this department? Yes   Does patient have an active prescription for medications requested? No   Received Request Via: Pharmacy

## 2017-03-31 ENCOUNTER — OFFICE VISIT (OUTPATIENT)
Dept: INTERNAL MEDICINE | Facility: MEDICAL CENTER | Age: 52
End: 2017-03-31
Payer: MEDICARE

## 2017-03-31 VITALS
HEART RATE: 51 BPM | TEMPERATURE: 98.6 F | OXYGEN SATURATION: 94 % | BODY MASS INDEX: 23.88 KG/M2 | HEIGHT: 66 IN | WEIGHT: 148.6 LBS | DIASTOLIC BLOOD PRESSURE: 77 MMHG | SYSTOLIC BLOOD PRESSURE: 142 MMHG

## 2017-03-31 DIAGNOSIS — M54.5 CHRONIC MIDLINE LOW BACK PAIN, WITH SCIATICA PRESENCE UNSPECIFIED: ICD-10-CM

## 2017-03-31 DIAGNOSIS — G89.29 CHRONIC MIDLINE LOW BACK PAIN, WITH SCIATICA PRESENCE UNSPECIFIED: ICD-10-CM

## 2017-03-31 DIAGNOSIS — F32.0 MILD SINGLE CURRENT EPISODE OF MAJOR DEPRESSIVE DISORDER (HCC): ICD-10-CM

## 2017-03-31 DIAGNOSIS — R87.629 ABNORMAL VAGINAL PAP SMEAR: ICD-10-CM

## 2017-03-31 DIAGNOSIS — Z00.00 HEALTHCARE MAINTENANCE: ICD-10-CM

## 2017-03-31 DIAGNOSIS — M79.7 FIBROMYALGIA: ICD-10-CM

## 2017-03-31 DIAGNOSIS — Z72.0 TOBACCO ABUSE: ICD-10-CM

## 2017-03-31 PROCEDURE — 99214 OFFICE O/P EST MOD 30 MIN: CPT | Mod: GC | Performed by: INTERNAL MEDICINE

## 2017-03-31 RX ORDER — GABAPENTIN 800 MG/1
800 TABLET ORAL 3 TIMES DAILY
COMMUNITY
End: 2021-03-13

## 2017-03-31 RX ORDER — ZOLPIDEM TARTRATE 5 MG/1
5 TABLET ORAL NIGHTLY PRN
COMMUNITY
End: 2021-03-13

## 2017-03-31 RX ORDER — DULOXETIN HYDROCHLORIDE 30 MG/1
60 CAPSULE, DELAYED RELEASE ORAL DAILY
Qty: 30 CAP | Refills: 6 | Status: SHIPPED | OUTPATIENT
Start: 2017-03-31 | End: 2017-04-06

## 2017-03-31 ASSESSMENT — ENCOUNTER SYMPTOMS
MYALGIAS: 1
BACK PAIN: 1
FEVER: 0
CONSTIPATION: 0
CLAUDICATION: 0
NEUROLOGICAL NEGATIVE: 1
EYE REDNESS: 0
NAUSEA: 0
WHEEZING: 0
HEMOPTYSIS: 0
BLOOD IN STOOL: 0
EYE DISCHARGE: 0
PALPITATIONS: 0
CHILLS: 0
DEPRESSION: 1
HEARTBURN: 0

## 2017-03-31 NOTE — PROGRESS NOTES
Established Patient    Cheyanne presents today with the following:    CC: Follow up for BP     HPI:     HTN- Last time, she reported high blood pressure despite taking her  Metoprolol 75mg BID SBP reaching 200/100's.  Last visit, her BP was 136/82.  Today, her /77.    She is compliant with her medications.      Hypercholesterolemia - She stopped taking statin due to muscle pain.  Though she had muscle pain and fibromyalgia for years.  After discontinuation of statin, she still has waxing and waning muscle aches.  We ordered CPK to r/o rhabdomyolysis.  She hasn't done the lab work yet.     Depression- She was started on Cymbalta 30mg about 5 wks ago.  She does notice improvement but still has depressive symptoms.  She denies SI and HI    Fibromyalgia- Managed by pain clinic. On Gabapentin.  She c/o her whole body hurts.  Didn't notice improvement in pain with Cymbalta 30mg.     Polyarthralgia-  She c/o whole body and all joint swelling.  She can't pin point where she gets swelling.  She states it swells all over not just her joints.  She states swelling lasts about 30 mintues or longer. Also c/o whole body aches.   Morning stiffness lasting about 30 minutes to all day.    Patient was evaluated by rheumatologist in the past and was diagnosed with Fibromyalgia.  She insisted on seeing rheumatologist for further evaluation as she feels that it's not just fibromyalgia.  Ordered work up for RA but she hasn't done the lab.  She was referred to rheumatologist and hasn't gotten appointment yet    Chronic low back pain - Followed by ortho and pain clinic.  On Norco, Meloxicam and gabapentin.  She is continued on Norco.  She feels steroid injections are not doing much for her.     Tobacco abuse - Started on chantix.        Healthcare maintenance  -Colonoscopy UTD. Wnl  -Pap- Last pap about 2 yrs ago.  H/o abnormal pap smear.  Was advised to follow up with Ob/Gyn given history of abnormal pap smear.  She hasn't  followed up with Ob/Gyn yet  - Mammogram due now. Still hasn't done mammogram yet.  Order was reprinted and given to the patient.         Patient Active Problem List    Diagnosis Date Noted   • Fibromyalgia 05/19/2016   • Muscle cramp 05/19/2016   • Tobacco abuse 05/19/2016   • Abnormal vaginal Pap smear 05/19/2016   • Hypercholesterolemia 05/19/2016   • Impaired fasting glucose 05/19/2016   • Chronic low back pain 05/19/2016   • Polyarthralgia 05/19/2016       Current Outpatient Prescriptions   Medication Sig Dispense Refill   • gabapentin (NEURONTIN) 800 MG tablet Take 800 mg by mouth 3 times a day.     • zolpidem (AMBIEN) 5 MG Tab Take 5 mg by mouth at bedtime as needed for Sleep.     • duloxetine (CYMBALTA) 30 MG Cap DR Particles Take 2 Caps by mouth every day. 30 Cap 6   • CHANTIX STARTING MONTH SHARAD 0.5 MG X 11 & 1 MG X 42 tablet USE AS DIRECTED 53 Tab 0   • metoprolol (LOPRESSOR) 50 MG Tab TAKE 1 AND 1/2 TABLETS BY MOUTH TWICE DAILY 90 Tab 0   • meloxicam (MOBIC) 15 MG tablet TK 1 T PO QD  2   • hydrocodone/acetaminophen (NORCO)  MG Tab Take 1-2 Tabs by mouth every 6 hours as needed.     • acetaminophen (TYLENOL) 500 MG Tab Take 500-1,000 mg by mouth every 6 hours as needed.       No current facility-administered medications for this visit.       ROS: As per HPI. Additional pertinent symptoms as noted below.      Review of Systems   Constitutional: Negative for fever and chills.   HENT: Negative.    Eyes: Negative for discharge and redness.   Respiratory: Negative for hemoptysis and wheezing.    Cardiovascular: Negative for chest pain, palpitations and claudication.   Gastrointestinal: Negative for heartburn, nausea, constipation and blood in stool.   Genitourinary: Negative for dysuria and urgency.   Musculoskeletal: Positive for myalgias, back pain and joint pain.        Joint and whole body swelling   Skin: Negative for itching and rash.   Neurological: Negative.    Endo/Heme/Allergies: Negative.   "  Psychiatric/Behavioral: Positive for depression.        Depressive symptoms improving       /77 mmHg  Pulse 51  Temp(Src) 37 °C (98.6 °F)  Ht 1.676 m (5' 5.98\")  Wt 67.405 kg (148 lb 9.6 oz)  BMI 24.00 kg/m2  SpO2 94%  Breastfeeding? No    Physical Exam   Constitutional:  oriented to person, place, and time. No distress.   Eyes: Pupils are equal, round, and reactive to light. No scleral icterus.  Neck: Neck supple. No thyromegaly present.   Cardiovascular: Normal rate, regular rhythm and normal heart sounds.  Exam reveals no gallop and no friction rub.  No murmur heard.  Pulmonary/Chest: Breath sounds normal. Chest wall is not dull to percussion.   Musculoskeletal:   no edema.   Whole  to palpation.   Abdomen:  Soft, BS+, Non tender, non distended  Lymphadenopathy: no cervical adenopathy  Neurological: alert and oriented to person, place, and time.   Skin: No cyanosis. Nails show no clubbing.      Assessment and Plan    1. History of Abnormal vaginal Pap smear  - She has a h/o abnormal pap smear that needed close monitoring by Ob/gyn.  She is not sure what type of lesion.   - Advised patient multiple times to follow up with Ob/Gyn given history of abnormal pap smear  - Patient was urged to follow up with Ob/Gyn asap to further evaluation  - If she don't followed up with her ob/gyn during next visit we will discuss about getting pap smear done in office       2. Chronic midline low back pain, with sciatica presence unspecified  - Followed by pain clinic  - will defer to pain clinic    3. Fibromyalgia  - Managed by pain clinic.  On Gabapentin  - Started Cymbalta for depression which would also help for fibromyalgia  - Will increase cymbalta to 60mg  - duloxetine (CYMBALTA) 30 MG Cap DR Particles; Take 2 Caps by mouth every day.  Dispense: 30 Cap; Refill: 6    4. Tobacco abuse  - Continue Chantix    5. Healthcare maintenance  - Colonoscopy UTD  - Patient still hasn't done Mammogram.    - " Mammogram re-ordered  - [id number]; Future    6. Mild single current episode of major depressive disorder (CMS-HCC)  - Improving with cymbalta  - Will increase dose to 60  - duloxetine (CYMBALTA) 30 MG Cap DR Particles; Take 2 Caps by mouth every day.  Dispense: 30 Cap; Refill: 6    7. Polyarthralgia  - Multiple joint pain  - She reports swelling and stiffness.   - No sign of tenosynovitis.    - Her hand appears puffy but no joint swelling. So unlikely related to polyarthralgia  - Lab work to r/o rheumatological disorder was ordered last time. She hasn't done it yet  - Patient to do lab work.  - Rheumatology referral was made last visit as patient insisted on seeing rheumatology.   - CTM    8. DLD  - She was on statin for primary prevention  - Self d/c-d statin as she was concerned that statin was causing her myalgia  - Her cholesterol status is unknown as she didn't do the lab  - Patient was advised to get lipid panel that was ordered last visit      Followup: Patient to follow up in 4 months.     Signed by: Jayla Pinzon M.D.

## 2017-03-31 NOTE — MR AVS SNAPSHOT
"        Cheyanne Lindsey   3/31/2017 8:30 AM   Office Visit   MRN: [id number]    Department:  [facility] - Internal Med   Dept Phone:  [phone]    Description:  Female : 1965   Provider:  Jayla Pinzon M.D.           Reason for Visit     Follow-Up           Allergies as of 3/31/2017     No Known Allergies      You were diagnosed with     Abnormal vaginal Pap smear   [582717]       Chronic midline low back pain, with sciatica presence unspecified   [4746176]       Fibromyalgia   [618734]       Tobacco abuse   [299974]       Healthcare maintenance   [597141]       Mild single current episode of major depressive disorder (CMS-HCC)   [7940532]       Mild single current episode of major depressive disorder (CMS-HCC)   [9438612]         Vital Signs     Blood Pressure Pulse Temperature Height Weight Body Mass Index    142/77 mmHg 51 37 °C (98.6 °F) 1.676 m (5' 5.98\") 67.405 kg (148 lb 9.6 oz) 24.00 kg/m2    Oxygen Saturation Breastfeeding? Smoking Status             94% No Current Some Day Smoker         Basic Information     Date Of Birth Sex Race Ethnicity Preferred Language    1965 Female White Non- English      Your appointments     2017 11:00 AM   New Patient with Gregorio Lujan M.D.   [facility] Neurology (--)    [address]  [facility] [address] [phone]           Please bring Photo ID, Insurance Cards, All Medication Bottles and copies of any legal documents (such as Living Will, Power of ) If speaking a language besides English please bring an adult . Please arrive 30 minutes prior for check in and registration. You will be receiving a confirmation call a few days before your appointment from our automated call confirmation system.            May 05, 2017  8:30 AM   Established Patient with Jayla Pinzon M.D.   [facility] - Internal Medicine (--)    [address]  [address]  [facility] [place] [facility] [address]   "   [phone]           You will be receiving a confirmation call a few days before your appointment from our automated call confirmation system.              Problem List              ICD-10-CM Priority Class Noted - Resolved    Fibromyalgia M79.7   5/19/2016 - Present    Muscle cramp R25.2   5/19/2016 - Present    Tobacco abuse Z72.0   5/19/2016 - Present    Abnormal vaginal Pap smear R87.629   5/19/2016 - Present    Hypercholesterolemia E78.00   5/19/2016 - Present    Impaired fasting glucose R73.01   5/19/2016 - Present    Chronic low back pain M54.5, G89.29   5/19/2016 - Present    Polyarthralgia M25.50   5/19/2016 - Present      Health Maintenance        Date Due Completion Dates    IMM DTaP/Tdap/Td Vaccine (1 - Tdap) 1/2/1983 ---    IMM PNEUMOCOCCAL 19-64 (ADULT) MEDIUM RISK SERIES (1 of 1 - PPSV23) 1/2/1983 ---    PAP SMEAR 1/2/1985 ---    MAMMOGRAM 2/4/2005 2/4/2004    IMM INFLUENZA (1) 9/1/2016 12/17/2015    COLONOSCOPY 12/15/2024 12/15/2014            Current Immunizations     Influenza Vaccine Quad Inj (Pf) 12/17/2015      Below and/or attached are the medications your provider expects you to take. Review all of your home medications and newly ordered medications with your provider and/or pharmacist. Follow medication instructions as directed by your provider and/or pharmacist. Please keep your medication list with you and share with your provider. Update the information when medications are discontinued, doses are changed, or new medications (including over-the-counter products) are added; and carry medication information at all times in the event of emergency situations     Allergies:  No Known Allergies          Medications  Valid as of: March 31, 2017 -  9:43 AM    Generic Name Brand Name Tablet Size Instructions for use    Acetaminophen (Tab) TYLENOL 500 MG Take 500-1,000 mg by mouth every 6 hours as needed.        DULoxetine HCl (Cap DR Particles) CYMBALTA 30 MG Take 2 Caps by mouth every day.         Gabapentin (Tab) NEURONTIN 800 MG Take 800 mg by mouth 3 times a day.        Hydrocodone-Acetaminophen (Tab) NORCO  MG Take 1-2 Tabs by mouth every 6 hours as needed.        Meloxicam (Tab) MOBIC 15 MG TK 1 T PO QD        Metoprolol Tartrate (Tab) LOPRESSOR 50 MG TAKE 1 AND 1/2 TABLETS BY MOUTH TWICE DAILY        Varenicline Tartrate (Tab) CHANTIX STARTING MONTH SHARAD 0.5 MG X 11 & 1 MG X 42 USE AS DIRECTED        Zolpidem Tartrate (Tab) AMBIEN 5 MG Take 5 mg by mouth at bedtime as needed for Sleep.        .                 Medicines prescribed today were sent to:     [facility] - [place], [place] - [address] [facility] [address]    [address] [place] [address]    Phone: [phone] Fax: [phone]    Open 24 Hours?: No    [facility] [place] - [address] [facility]  [address]    [address] [facility] [address]    Phone: [phone] Fax: [phone]    Open 24 Hours?: No      Medication refill instructions:       If your prescription bottle indicates you have medication refills left, it is not necessary to call your provider’s office. Please contact your pharmacy and they will refill your medication.    If your prescription bottle indicates you do not have any refills left, you may request refills at any time through one of the following ways: The online [employer] system (except Urgent Care), by calling your provider’s office, or by asking your pharmacy to contact your provider’s office with a refill request. Medication refills are processed only during regular business hours and may not be available until the next business day. Your provider may request additional information or to have a follow-up visit with you prior to refilling your medication.   *Please Note: Medication refills are assigned a new Rx number when refilled electronically. Your pharmacy may indicate that no refills were authorized even though a new prescription  for the same medication is available at the pharmacy. Please request the medicine by name with the pharmacy before contacting your provider for a refill.        Your To Do List     Future Labs/Procedures Complete By Expires    [id number]  As directed 3/31/2018         Newco LS15 Access Code: [email or web address]  Expires: 4/30/2017  9:43 AM    Newco LS15  A secure, online tool to manage your health information     [employer] Newco LS15® is a secure, online tool that connects you to your personalized health information from the privacy of your home -- day or night - making it very easy for you to manage your healthcare. Once the activation process is completed, you can even access your medical information using the Newco LS15 kortney, which is available for free in the Apple Kortney store or Google Play store.     Newco LS15 provides the following levels of access (as shown below):   My Chart Features   [facility] Primary Care Doctor [facility]  Specialists [facility]  Urgent  Care Non-[facility]  Primary Care  Doctor   Email your healthcare team securely and privately 24/7 X X X    Manage appointments: schedule your next appointment; view details of past/upcoming appointments X      Request prescription refills. X      View recent personal medical records, including lab and immunizations X X X X   View health record, including health history, allergies, medications X X X X   Read reports about your outpatient visits, procedures, consult and ER notes X X X X   See your discharge summary, which is a recap of your hospital and/or ER visit that includes your diagnosis, lab results, and care plan. X X       How to register for Newco LS15:  1. Go to  [email or web address].  2. Click on the Sign Up Now box, which takes you to the New Member Sign Up page. You will need to provide the following information:  a. Enter your Newco LS15 Access Code exactly as it appears at the top of this page. (You will not need to use this code after you’ve completed  the sign-up process. If you do not sign up before the expiration date, you must request a new code.)   b. Enter your date of birth.   c. Enter your home email address.   d. Click Submit, and follow the next screen’s instructions.  3. Create a ESKY ID. This will be your ESKY login ID and cannot be changed, so think of one that is secure and easy to remember.  4. Create a IMANINt password. You can change your password at any time.  5. Enter your Password Reset Question and Answer. This can be used at a later time if you forget your password.   6. Enter your e-mail address. This allows you to receive e-mail notifications when new information is available in ESKY.  7. Click Sign Up. You can now view your health information.    For assistance activating your ESKY account, call [phone]        Quit Tobacco Information     Do you want to quit using tobacco?    Quitting tobacco decreases risks of cancer, heart and lung disease, increases life expectancy, improves sense of taste and smell, and increases spending money, among other benefits.    If you are thinking about quitting, we can help.  • [facility] Quit Tobacco Program: 689.355.5638  o Program occurs weekly for four weeks and includes pharmacist consultation on products to support quitting smoking or chewing tobacco. A provider referral is needed for pharmacist consultation.  • Tobacco Users Help Hotline: 9-800QUIT-NOW (566-0345) or https://[place].quitlogix.org/  o Free, confidential telephone and online coaching for [place] residents. Sessions are designed on a schedule that is convenient for you. Eligible clients receive free nicotine replacement therapy.  • Nationally: www.smokefree.gov  o Information and professional assistance to support both immediate and long-term needs as you become, and remain, a non-smoker. Smokefree.gov allows you to choose the help that best fits your needs.

## 2017-04-06 ENCOUNTER — TELEPHONE (OUTPATIENT)
Dept: INTERNAL MEDICINE | Facility: MEDICAL CENTER | Age: 52
End: 2017-04-06

## 2017-04-06 DIAGNOSIS — M79.7 FIBROMYALGIA: ICD-10-CM

## 2017-04-06 DIAGNOSIS — F32.1 MODERATE SINGLE CURRENT EPISODE OF MAJOR DEPRESSIVE DISORDER (HCC): ICD-10-CM

## 2017-04-06 RX ORDER — DULOXETIN HYDROCHLORIDE 60 MG/1
60 CAPSULE, DELAYED RELEASE ORAL DAILY
Qty: 30 CAP | Refills: 6 | Status: SHIPPED | OUTPATIENT
Start: 2017-04-06 | End: 2017-04-06 | Stop reason: SDUPTHER

## 2017-04-06 NOTE — TELEPHONE ENCOUNTER
DOCUMENTATION OF PAR STATUS:    1. Name of Medication & Dose: Duloxetine Dr 30mg take 2 capsules by mouth every day     2. Name of Prescription Coverage Company & phone #: medicare [phone]    3. Date Prior Auth Submitted: 04/06/17    4. What information was given to obtain insurance decision? Fibromyalgia    5. Prior Auth Letter Approved or Denied? Denied plan does not cover medication at 2 caps qd- can change duloxetine 60mg take 1 tab daily     6. Action Taken: Pharmacy/Patient Notified: No

## 2017-04-07 NOTE — TELEPHONE ENCOUNTER
Jayla Pinzon M.D.  [facility]        Caller: Unspecified (Yesterday, 2:53 PM)                     Rx sent to pharmacy.  Thank you.

## 2017-04-07 NOTE — TELEPHONE ENCOUNTER
Last seen: 03/31/17 by Dr. Pinzon  Next appt: 05/05/17 with Dr. Pinzon    Was the patient seen in the last year in this department? Yes   Does patient have an active prescription for medications requested? No   Received Request Via: Pharmacy      Pt requesting 90 day supply

## 2017-04-10 ENCOUNTER — HOSPITAL ENCOUNTER (OUTPATIENT)
Dept: RADIOLOGY | Facility: MEDICAL CENTER | Age: 52
End: 2017-04-10

## 2017-04-10 RX ORDER — DULOXETIN HYDROCHLORIDE 60 MG/1
CAPSULE, DELAYED RELEASE ORAL
Qty: 90 CAP | Refills: 0 | Status: SHIPPED | OUTPATIENT
Start: 2017-04-10 | End: 2023-07-26

## 2017-04-15 ENCOUNTER — HOSPITAL ENCOUNTER (OUTPATIENT)
Dept: RADIOLOGY | Facility: MEDICAL CENTER | Age: 52
End: 2017-04-15
Attending: INTERNAL MEDICINE
Payer: MEDICARE

## 2017-04-15 DIAGNOSIS — Z00.00 HEALTHCARE MAINTENANCE: ICD-10-CM

## 2017-04-15 PROCEDURE — 77063 BREAST TOMOSYNTHESIS BI: CPT

## 2017-04-18 RX ORDER — METOPROLOL TARTRATE 50 MG/1
TABLET, FILM COATED ORAL
Qty: 270 TAB | Refills: 1 | Status: SHIPPED | OUTPATIENT
Start: 2017-04-18 | End: 2017-10-08 | Stop reason: SDUPTHER

## 2017-05-05 ENCOUNTER — APPOINTMENT (OUTPATIENT)
Dept: INTERNAL MEDICINE | Facility: MEDICAL CENTER | Age: 52
End: 2017-05-05
Payer: MEDICARE

## 2017-09-08 ENCOUNTER — APPOINTMENT (OUTPATIENT)
Dept: INTERNAL MEDICINE | Facility: MEDICAL CENTER | Age: 52
End: 2017-09-08
Payer: MEDICARE

## 2017-10-09 NOTE — TELEPHONE ENCOUNTER
Was the patient seen in the last year in this department? Yes  Pt last seen on: 03/31/17 by Dr. Pinzon  Next appt on: none       Does patient have an active prescription for medications requested? No     Received Request Via: Pharmacy

## 2017-10-10 RX ORDER — METOPROLOL TARTRATE 50 MG/1
TABLET, FILM COATED ORAL
Qty: 90 TAB | Refills: 0 | Status: ON HOLD | OUTPATIENT
Start: 2017-10-10 | End: 2021-03-24

## 2021-03-13 ENCOUNTER — HOSPITAL ENCOUNTER (EMERGENCY)
Facility: MEDICAL CENTER | Age: 56
End: 2021-03-13
Attending: EMERGENCY MEDICINE
Payer: MEDICARE

## 2021-03-13 VITALS
HEIGHT: 67 IN | RESPIRATION RATE: 18 BRPM | DIASTOLIC BLOOD PRESSURE: 68 MMHG | OXYGEN SATURATION: 93 % | TEMPERATURE: 97.7 F | HEART RATE: 76 BPM | WEIGHT: 161.82 LBS | BODY MASS INDEX: 25.4 KG/M2 | SYSTOLIC BLOOD PRESSURE: 164 MMHG

## 2021-03-13 DIAGNOSIS — K04.7 DENTAL INFECTION: ICD-10-CM

## 2021-03-13 DIAGNOSIS — B34.9 VIRAL SYNDROME: ICD-10-CM

## 2021-03-13 DIAGNOSIS — E83.52 HYPERCALCEMIA: ICD-10-CM

## 2021-03-13 DIAGNOSIS — I10 ESSENTIAL HYPERTENSION: ICD-10-CM

## 2021-03-13 LAB
ALBUMIN SERPL BCP-MCNC: 4.7 G/DL (ref 3.2–4.9)
ALBUMIN/GLOB SERPL: 2 G/DL
ALP SERPL-CCNC: 94 U/L (ref 30–99)
ALT SERPL-CCNC: 26 U/L (ref 2–50)
ANION GAP SERPL CALC-SCNC: 14 MMOL/L (ref 7–16)
AST SERPL-CCNC: 50 U/L (ref 12–45)
BASOPHILS # BLD AUTO: 0.6 % (ref 0–1.8)
BASOPHILS # BLD: 0.07 K/UL (ref 0–0.12)
BILIRUB SERPL-MCNC: 1.2 MG/DL (ref 0.1–1.5)
BUN SERPL-MCNC: 12 MG/DL (ref 8–22)
CALCIUM SERPL-MCNC: 12.2 MG/DL (ref 8.5–10.5)
CHLORIDE SERPL-SCNC: 96 MMOL/L (ref 96–112)
CO2 SERPL-SCNC: 28 MMOL/L (ref 20–33)
CREAT SERPL-MCNC: 0.84 MG/DL (ref 0.5–1.4)
EOSINOPHIL # BLD AUTO: 0.05 K/UL (ref 0–0.51)
EOSINOPHIL NFR BLD: 0.4 % (ref 0–6.9)
ERYTHROCYTE [DISTWIDTH] IN BLOOD BY AUTOMATED COUNT: 42.8 FL (ref 35.9–50)
GLOBULIN SER CALC-MCNC: 2.4 G/DL (ref 1.9–3.5)
GLUCOSE SERPL-MCNC: 135 MG/DL (ref 65–99)
HCT VFR BLD AUTO: 49.1 % (ref 37–47)
HGB BLD-MCNC: 16.7 G/DL (ref 12–16)
IMM GRANULOCYTES # BLD AUTO: 0.03 K/UL (ref 0–0.11)
IMM GRANULOCYTES NFR BLD AUTO: 0.3 % (ref 0–0.9)
LYMPHOCYTES # BLD AUTO: 2.38 K/UL (ref 1–4.8)
LYMPHOCYTES NFR BLD: 20.7 % (ref 22–41)
MCH RBC QN AUTO: 31.6 PG (ref 27–33)
MCHC RBC AUTO-ENTMCNC: 34 G/DL (ref 33.6–35)
MCV RBC AUTO: 92.8 FL (ref 81.4–97.8)
MONOCYTES # BLD AUTO: 0.89 K/UL (ref 0–0.85)
MONOCYTES NFR BLD AUTO: 7.8 % (ref 0–13.4)
NEUTROPHILS # BLD AUTO: 8.05 K/UL (ref 2–7.15)
NEUTROPHILS NFR BLD: 70.2 % (ref 44–72)
NRBC # BLD AUTO: 0 K/UL
NRBC BLD-RTO: 0 /100 WBC
PLATELET # BLD AUTO: 216 K/UL (ref 164–446)
PMV BLD AUTO: 9.5 FL (ref 9–12.9)
POTASSIUM SERPL-SCNC: 3.5 MMOL/L (ref 3.6–5.5)
PROT SERPL-MCNC: 7.1 G/DL (ref 6–8.2)
RBC # BLD AUTO: 5.29 M/UL (ref 4.2–5.4)
SARS-COV-2 RNA RESP QL NAA+PROBE: NOTDETECTED
SODIUM SERPL-SCNC: 138 MMOL/L (ref 135–145)
SPECIMEN SOURCE: NORMAL
WBC # BLD AUTO: 11.5 K/UL (ref 4.8–10.8)

## 2021-03-13 PROCEDURE — 80053 COMPREHEN METABOLIC PANEL: CPT

## 2021-03-13 PROCEDURE — 99284 EMERGENCY DEPT VISIT MOD MDM: CPT

## 2021-03-13 PROCEDURE — 96375 TX/PRO/DX INJ NEW DRUG ADDON: CPT

## 2021-03-13 PROCEDURE — 700111 HCHG RX REV CODE 636 W/ 250 OVERRIDE (IP): Performed by: EMERGENCY MEDICINE

## 2021-03-13 PROCEDURE — 700101 HCHG RX REV CODE 250: Performed by: EMERGENCY MEDICINE

## 2021-03-13 PROCEDURE — U0005 INFEC AGEN DETEC AMPLI PROBE: HCPCS

## 2021-03-13 PROCEDURE — 96365 THER/PROPH/DIAG IV INF INIT: CPT

## 2021-03-13 PROCEDURE — 85025 COMPLETE CBC W/AUTO DIFF WBC: CPT

## 2021-03-13 PROCEDURE — U0003 INFECTIOUS AGENT DETECTION BY NUCLEIC ACID (DNA OR RNA); SEVERE ACUTE RESPIRATORY SYNDROME CORONAVIRUS 2 (SARS-COV-2) (CORONAVIRUS DISEASE [COVID-19]), AMPLIFIED PROBE TECHNIQUE, MAKING USE OF HIGH THROUGHPUT TECHNOLOGIES AS DESCRIBED BY CMS-2020-01-R: HCPCS

## 2021-03-13 PROCEDURE — 700105 HCHG RX REV CODE 258: Performed by: EMERGENCY MEDICINE

## 2021-03-13 RX ORDER — LABETALOL HYDROCHLORIDE 5 MG/ML
10 INJECTION, SOLUTION INTRAVENOUS ONCE
Status: COMPLETED | OUTPATIENT
Start: 2021-03-13 | End: 2021-03-13

## 2021-03-13 RX ORDER — AMOXICILLIN AND CLAVULANATE POTASSIUM 875; 125 MG/1; MG/1
1 TABLET, FILM COATED ORAL 2 TIMES DAILY
Qty: 14 TABLET | Refills: 0 | Status: SHIPPED | OUTPATIENT
Start: 2021-03-13 | End: 2021-03-20

## 2021-03-13 RX ADMIN — SODIUM CHLORIDE 3 G: 900 INJECTION INTRAVENOUS at 17:28

## 2021-03-13 RX ADMIN — LABETALOL HYDROCHLORIDE 10 MG: 5 INJECTION, SOLUTION INTRAVENOUS at 18:44

## 2021-03-13 NOTE — ED NOTES
Pt ambulated to green 25 with steady gait. Changed into gown and placed on all monitors. Call light in reach and warm blankets provided. ERP to see.

## 2021-03-13 NOTE — ED TRIAGE NOTES
"Chief Complaint   Patient presents with   • Shortness of Breath     Pt started having SOB and a cough starting 3 days ago. Pt states she \"can't walk far distances without being out of breath\". Pt also states \"her food tastes hideous\". Pt states she is coughing up phlem that started last night.    • N/V     Patient states she has been \"throwing up\" since yesterday. Pt ate breakfast this AM and hasn't had any more episodes of throwing up. Pt also states she is having diarrhea.    • Tooth Abscess     Pt states she \"has had tooth abscesses for years\" but states the pain is now significantly worse.    • Weakness     Pt states her \"legs and hips have been really weak\". Pt fell on the stairs yesterday due to the weakness.      BP (!) 213/131 Comment: patient states she out of her RX  Pulse 93   Temp 36.4 °C (97.6 °F)   Resp 18   Ht 1.702 m (5' 7\")   Wt 73.4 kg (161 lb 13.1 oz)   LMP 10/04/2011   SpO2 96%   BMI 25.34 kg/m²     Patient arrived to ED for the above complaint. Triage process explained to patient. Patient placed in senior lounge and told to notify staff of any changes.   "

## 2021-03-14 NOTE — ED NOTES
Med rec complete per interview with pt at bedside. Pt states that she takes tylenol 500 mg 2-3 tabs q3-4h for the past 3 days. Allergies reviewed. Pt denies abx use in the past 14 days.

## 2021-03-14 NOTE — ED PROVIDER NOTES
"ED Provider Note    CHIEF COMPLAINT  Chief Complaint   Patient presents with   • Shortness of Breath     Pt started having SOB and a cough starting 3 days ago. Pt states she \"can't walk far distances without being out of breath\". Pt also states \"her food tastes hideous\". Pt states she is coughing up phlem that started last night.    • N/V     Patient states she has been \"throwing up\" since yesterday. Pt ate breakfast this AM and hasn't had any more episodes of throwing up. Pt also states she is having diarrhea.    • Tooth Abscess     Pt states she \"has had tooth abscesses for years\" but states the pain is now significantly worse.    • Weakness     Pt states her \"legs and hips have been really weak\". Pt fell on the stairs yesterday due to the weakness.        HPI  Cheyanne Lindsey is a 56 y.o. female who presents to the emergency department with multiple vague complaints.  First complaint is states that she has had tooth abscesses since 1996 have been increasing severity.  She did see a dentist in 1996 and had intervention at that point time and she had a very bad outcome secondary to her restless leg syndrome.  In addition, she states that since that time she has had pus and dental infections intermittently for the last several years.  Now she is complaining infection the bilateral teeth.  She has pain without swelling, denies recent pus or fever.  In addition, she is complaining of nausea and vomiting states she has been throwing up since yesterday a.m. and has any episodes of throwing up today.  Patient states that she is also having intermittent diarrhea.  She also feels weak throughout her entire body.  She had multiple other complaints at this point but states these were 3 main complaints that she would like addressed as well as a Covid test.  Has Covid exposure, cough, severe cough although does endorse slight shortness of breath.  She does have a primary care doctor who ordered lab test and 11/20 but she " is yet to fill those is asked me to complete this for her.  REVIEW OF SYSTEMS  Positives as above. Pertinent negatives include hematochezia, melena, hematemesis, fever, neck pain.  All other 10 review of systems are negative    PAST MEDICAL HISTORY  Past Medical History:   Diagnosis Date   • Chronic low back pain 5/19/2016   • Hip pain, right    • Hypertension    • Impaired fasting glucose 5/19/2016   • Polyarthralgia 5/19/2016   • RLS (restless legs syndrome) 5/19/2016   • Vaginal discharge 5/19/2016   • Vertigo        FAMILY HISTORY  Noncontributory    SOCIAL HISTORY  Social History     Socioeconomic History   • Marital status: Single     Spouse name: Not on file   • Number of children: Not on file   • Years of education: Not on file   • Highest education level: Not on file   Occupational History   • Not on file   Tobacco Use   • Smoking status: Current Some Day Smoker     Packs/day: 0.25     Years: 32.00     Pack years: 8.00     Last attempt to quit: 10/24/2010     Years since quitting: 10.3   • Smokeless tobacco: Never Used   Substance and Sexual Activity   • Alcohol use: No     Alcohol/week: 0.0 oz     Comment: QUIT IN 1989   • Drug use: No   • Sexual activity: Yes     Partners: Male   Other Topics Concern   • Not on file   Social History Narrative   • Not on file     Social Determinants of Health     Financial Resource Strain:    • Difficulty of Paying Living Expenses:    Food Insecurity:    • Worried About Running Out of Food in the Last Year:    • Ran Out of Food in the Last Year:    Transportation Needs:    • Lack of Transportation (Medical):    • Lack of Transportation (Non-Medical):    Physical Activity:    • Days of Exercise per Week:    • Minutes of Exercise per Session:    Stress:    • Feeling of Stress :    Social Connections:    • Frequency of Communication with Friends and Family:    • Frequency of Social Gatherings with Friends and Family:    • Attends Yarsani Services:    • Active Member of  "Clubs or Organizations:    • Attends Club or Organization Meetings:    • Marital Status:    Intimate Partner Violence:    • Fear of Current or Ex-Partner:    • Emotionally Abused:    • Physically Abused:    • Sexually Abused:        SURGICAL HISTORY  Past Surgical History:   Procedure Laterality Date   • GYN SURGERY      c sec X2; tubal ligation   • OTHER ORTHOPEDIC SURGERY      jaw sx in 5th grade       CURRENT MEDICATIONS  Home Medications     Reviewed by Diogo Moran (Pharmacy Tech) on 03/13/21 at 1808  Med List Status: Complete   Medication Last Dose Status   acetaminophen (TYLENOL) 500 MG Tab 3/13/2021 Active   CHANTIX STARTING MONTH SHARAD 0.5 MG X 11 & 1 MG X 42 tablet Not Taking Active   duloxetine (CYMBALTA) 60 MG Cap DR Particles delayed-release capsule Not Taking Active   metoprolol (LOPRESSOR) 50 MG Tab 3/13/2021 Active                ALLERGIES  No Known Allergies    PHYSICAL EXAM  VITAL SIGNS: BP (!) 164/68   Pulse 76   Temp 36.5 °C (97.7 °F) (Temporal)   Resp 18   Ht 1.702 m (5' 7\")   Wt 73.4 kg (161 lb 13.1 oz)   LMP 10/04/2011   SpO2 93%   BMI 25.34 kg/m²      Constitutional: Well developed, Well nourished, No acute distress, Non-toxic appearance.   Eyes: PERRLA, EOMI, Conjunctiva normal, No discharge.   HENT: Oropharyngeal erythema, slight gingival erythema, no discharge, no mass, no abscess, Mallampati 1, no tympanic membrane erythema or edema  Cardiovascular: Normal heart rate, Normal rhythm, No murmurs, No rubs, No gallops, and intact distal pulses.   Thorax & Lungs:  No respiratory distress, no rales, no rhonchi, No wheezing, No chest wall tenderness.   Abdomen: Bowel sounds normal, Soft, No tenderness, No guarding, No rebound, No pulsatile masses.   Skin: Warm, Dry, No erythema, No rash.   Extremities: Full range of motion, no deformity, no edema.  Neurologic: Alert & oriented x 3, No focal deficits noted, acting appropriately on exam.  Psychiatric: Extremely anxious.    Results " for orders placed or performed during the hospital encounter of 03/13/21   CBC WITH DIFFERENTIAL   Result Value Ref Range    WBC 11.5 (H) 4.8 - 10.8 K/uL    RBC 5.29 4.20 - 5.40 M/uL    Hemoglobin 16.7 (H) 12.0 - 16.0 g/dL    Hematocrit 49.1 (H) 37.0 - 47.0 %    MCV 92.8 81.4 - 97.8 fL    MCH 31.6 27.0 - 33.0 pg    MCHC 34.0 33.6 - 35.0 g/dL    RDW 42.8 35.9 - 50.0 fL    Platelet Count 216 164 - 446 K/uL    MPV 9.5 9.0 - 12.9 fL    Neutrophils-Polys 70.20 44.00 - 72.00 %    Lymphocytes 20.70 (L) 22.00 - 41.00 %    Monocytes 7.80 0.00 - 13.40 %    Eosinophils 0.40 0.00 - 6.90 %    Basophils 0.60 0.00 - 1.80 %    Immature Granulocytes 0.30 0.00 - 0.90 %    Nucleated RBC 0.00 /100 WBC    Neutrophils (Absolute) 8.05 (H) 2.00 - 7.15 K/uL    Lymphs (Absolute) 2.38 1.00 - 4.80 K/uL    Monos (Absolute) 0.89 (H) 0.00 - 0.85 K/uL    Eos (Absolute) 0.05 0.00 - 0.51 K/uL    Baso (Absolute) 0.07 0.00 - 0.12 K/uL    Immature Granulocytes (abs) 0.03 0.00 - 0.11 K/uL    NRBC (Absolute) 0.00 K/uL   CMP   Result Value Ref Range    Sodium 138 135 - 145 mmol/L    Potassium 3.5 (L) 3.6 - 5.5 mmol/L    Chloride 96 96 - 112 mmol/L    Co2 28 20 - 33 mmol/L    Anion Gap 14.0 7.0 - 16.0    Glucose 135 (H) 65 - 99 mg/dL    Bun 12 8 - 22 mg/dL    Creatinine 0.84 0.50 - 1.40 mg/dL    Calcium 12.2 (HH) 8.5 - 10.5 mg/dL    AST(SGOT) 50 (H) 12 - 45 U/L    ALT(SGPT) 26 2 - 50 U/L    Alkaline Phosphatase 94 30 - 99 U/L    Total Bilirubin 1.2 0.1 - 1.5 mg/dL    Albumin 4.7 3.2 - 4.9 g/dL    Total Protein 7.1 6.0 - 8.2 g/dL    Globulin 2.4 1.9 - 3.5 g/dL    A-G Ratio 2.0 g/dL   SARS-CoV-2 PCR (24 hour In-House): Collect NP swab in VTM    Specimen: Respirate   Result Value Ref Range    SARS-CoV-2 Source NP Swab    ESTIMATED GFR   Result Value Ref Range    GFR If African American >60 >60 mL/min/1.73 m 2    GFR If Non African American >60 >60 mL/min/1.73 m 2         COURSE & MEDICAL DECISION MAKING  Pertinent Labs & Imaging studies reviewed. (See chart  for details)  This is a pleasant 56-year-old female presents with multiple complaints including dental infection, possible Covid, restless leg syndrome, nausea.  IV is established, she received 1 L normal saline secondary to her states she feels dehydrated.  Clinical evaluation, the patient has no evidence of.  Dental abscess requiring surgical intervention, she has no clinical evidence of pneumonia therefore x-rays are completed.  The patient did receive Unasyn IV secondary to her dental infection and I did give her Augmentin prescription for dental infection follow-up with Dr. Ahmadi.  She was found to be slightly hypercalcemic with a calcium of 12.2 with a normal albumin.  They do believe the fluid will help with this I have asked her to follow-up with her primary care physician for further evaluation and testing for parathyroid disease or thyroid disease in addition repeat evaluation of her calcium.  She discretely hypertensive here but she is exquisitely anxious as well.  She received labetalol IV with significant improvement in her blood pressure I do not believe she has hypertensive emergency or urgency.  She is not having any symptoms secondary to blood pressure have asked her to follow-up with her primary care physician for this.  A Covid test was taken and she will be following up with my chart tomorrow for further evaluation management her lab results.  Strict return precautions have been given.    FINAL IMPRESSION     1. Dental infection Active   2. Viral syndrome Active   3. Hypercalcemia Active   4. Essential hypertension Active         DISPOSITION:  Patient will be discharged home in stable condition.    FOLLOW UP:  [facility], Emergency Dept  [address] [address] [phone]    If symptoms worsen    MICHAEL OswaldPJEANNETTE  [address]  [facility] [address]  [phone]    Schedule an appointment as soon as possible for a visit   As needed    Adalberto AZAR  JON RosarioS.  [address]  [place] [address]  [phone]            OUTPATIENT MEDICATIONS:  Discharge Medication List as of 3/13/2021  7:03 PM      START taking these medications    Details   amoxicillin-clavulanate (AUGMENTIN) 875-125 MG Tab Take 1 tablet by mouth 2 times a day for 7 days., Disp-14 tablet, R-0, Print Rx Paper               Electronically signed by: Omar Yoo D.O., 3/13/2021 4:31 PM

## 2021-03-15 ENCOUNTER — HOSPITAL ENCOUNTER (EMERGENCY)
Facility: MEDICAL CENTER | Age: 56
End: 2021-03-15
Payer: MEDICARE

## 2021-03-15 VITALS
DIASTOLIC BLOOD PRESSURE: 112 MMHG | RESPIRATION RATE: 20 BRPM | TEMPERATURE: 97.1 F | BODY MASS INDEX: 25.4 KG/M2 | HEART RATE: 115 BPM | OXYGEN SATURATION: 96 % | SYSTOLIC BLOOD PRESSURE: 202 MMHG | HEIGHT: 67 IN | WEIGHT: 161.82 LBS

## 2021-03-15 PROCEDURE — 302449 STATCHG TRIAGE ONLY (STATISTIC)

## 2021-03-15 ASSESSMENT — FIBROSIS 4 INDEX: FIB4 SCORE: 2.59

## 2021-03-15 NOTE — ED NOTES
Patient asking to know the results of her covid test from 2 days ago.  Having trouble accessing it on [email or web address].  Patient told her results.  Afterwards she decided that she did not want to be see and exited the triage room.

## 2021-03-22 ENCOUNTER — APPOINTMENT (OUTPATIENT)
Dept: RADIOLOGY | Facility: MEDICAL CENTER | Age: 56
End: 2021-03-22
Attending: STUDENT IN AN ORGANIZED HEALTH CARE EDUCATION/TRAINING PROGRAM
Payer: MEDICARE

## 2021-03-22 ENCOUNTER — APPOINTMENT (OUTPATIENT)
Dept: RADIOLOGY | Facility: MEDICAL CENTER | Age: 56
End: 2021-03-22
Attending: EMERGENCY MEDICINE
Payer: MEDICARE

## 2021-03-22 ENCOUNTER — APPOINTMENT (OUTPATIENT)
Dept: CARDIOLOGY | Facility: MEDICAL CENTER | Age: 56
End: 2021-03-22
Attending: STUDENT IN AN ORGANIZED HEALTH CARE EDUCATION/TRAINING PROGRAM
Payer: MEDICARE

## 2021-03-22 ENCOUNTER — HOSPITAL ENCOUNTER (OUTPATIENT)
Facility: MEDICAL CENTER | Age: 56
End: 2021-03-24
Attending: EMERGENCY MEDICINE | Admitting: STUDENT IN AN ORGANIZED HEALTH CARE EDUCATION/TRAINING PROGRAM
Payer: MEDICARE

## 2021-03-22 DIAGNOSIS — M79.605 PAIN IN BOTH LOWER EXTREMITIES: ICD-10-CM

## 2021-03-22 DIAGNOSIS — M79.604 PAIN IN BOTH LOWER EXTREMITIES: ICD-10-CM

## 2021-03-22 DIAGNOSIS — R07.9 CHEST PAIN, UNSPECIFIED TYPE: ICD-10-CM

## 2021-03-22 DIAGNOSIS — I16.0 HYPERTENSIVE URGENCY: ICD-10-CM

## 2021-03-22 PROBLEM — I10 HTN (HYPERTENSION): Status: ACTIVE | Noted: 2021-03-22

## 2021-03-22 PROBLEM — E11.9 DM (DIABETES MELLITUS) (HCC): Status: ACTIVE | Noted: 2021-03-22

## 2021-03-22 PROBLEM — R79.89 ELEVATED BRAIN NATRIURETIC PEPTIDE (BNP) LEVEL: Status: ACTIVE | Noted: 2021-03-22

## 2021-03-22 LAB
AMPHET UR QL SCN: NEGATIVE
ANION GAP SERPL CALC-SCNC: 17 MMOL/L (ref 7–16)
BARBITURATES UR QL SCN: NEGATIVE
BASOPHILS # BLD AUTO: 0.5 % (ref 0–1.8)
BASOPHILS # BLD: 0.06 K/UL (ref 0–0.12)
BENZODIAZ UR QL SCN: NEGATIVE
BUN SERPL-MCNC: 17 MG/DL (ref 8–22)
BZE UR QL SCN: NEGATIVE
CALCIUM SERPL-MCNC: 9.9 MG/DL (ref 8.5–10.5)
CANNABINOIDS UR QL SCN: POSITIVE
CHLORIDE SERPL-SCNC: 99 MMOL/L (ref 96–112)
CHOLEST SERPL-MCNC: 207 MG/DL (ref 100–199)
CO2 SERPL-SCNC: 21 MMOL/L (ref 20–33)
CREAT SERPL-MCNC: 0.69 MG/DL (ref 0.5–1.4)
D DIMER PPP IA.FEU-MCNC: 0.63 UG/ML (FEU) (ref 0–0.5)
EKG IMPRESSION: NORMAL
EOSINOPHIL # BLD AUTO: 0.13 K/UL (ref 0–0.51)
EOSINOPHIL NFR BLD: 1.2 % (ref 0–6.9)
ERYTHROCYTE [DISTWIDTH] IN BLOOD BY AUTOMATED COUNT: 41.4 FL (ref 35.9–50)
EST. AVERAGE GLUCOSE BLD GHB EST-MCNC: 151 MG/DL
GLUCOSE BLD-MCNC: 127 MG/DL (ref 65–99)
GLUCOSE BLD-MCNC: 135 MG/DL (ref 65–99)
GLUCOSE BLD-MCNC: 136 MG/DL (ref 65–99)
GLUCOSE BLD-MCNC: 175 MG/DL (ref 65–99)
GLUCOSE SERPL-MCNC: 245 MG/DL (ref 65–99)
HBA1C MFR BLD: 6.9 % (ref 4–5.6)
HCT VFR BLD AUTO: 45.9 % (ref 37–47)
HDLC SERPL-MCNC: 34 MG/DL
HGB BLD-MCNC: 15.5 G/DL (ref 12–16)
IMM GRANULOCYTES # BLD AUTO: 0.06 K/UL (ref 0–0.11)
IMM GRANULOCYTES NFR BLD AUTO: 0.5 % (ref 0–0.9)
LDLC SERPL CALC-MCNC: 148 MG/DL
LV EJECT FRACT  99904: 65
LV EJECT FRACT MOD 2C 99903: 66.42
LV EJECT FRACT MOD 4C 99902: 68.54
LV EJECT FRACT MOD BP 99901: 66.55
LYMPHOCYTES # BLD AUTO: 2.19 K/UL (ref 1–4.8)
LYMPHOCYTES NFR BLD: 20 % (ref 22–41)
MCH RBC QN AUTO: 30.8 PG (ref 27–33)
MCHC RBC AUTO-ENTMCNC: 33.8 G/DL (ref 33.6–35)
MCV RBC AUTO: 91.3 FL (ref 81.4–97.8)
METHADONE UR QL SCN: NEGATIVE
MONOCYTES # BLD AUTO: 0.71 K/UL (ref 0–0.85)
MONOCYTES NFR BLD AUTO: 6.5 % (ref 0–13.4)
NEUTROPHILS # BLD AUTO: 7.79 K/UL (ref 2–7.15)
NEUTROPHILS NFR BLD: 71.3 % (ref 44–72)
NRBC # BLD AUTO: 0 K/UL
NRBC BLD-RTO: 0 /100 WBC
NT-PROBNP SERPL IA-MCNC: 590 PG/ML (ref 0–125)
OPIATES UR QL SCN: NEGATIVE
OXYCODONE UR QL SCN: NEGATIVE
PCP UR QL SCN: NEGATIVE
PLATELET # BLD AUTO: 273 K/UL (ref 164–446)
PMV BLD AUTO: 10 FL (ref 9–12.9)
POTASSIUM SERPL-SCNC: 3.7 MMOL/L (ref 3.6–5.5)
PROPOXYPH UR QL SCN: NEGATIVE
RBC # BLD AUTO: 5.03 M/UL (ref 4.2–5.4)
SARS-COV-2 RNA RESP QL NAA+PROBE: NOTDETECTED
SODIUM SERPL-SCNC: 137 MMOL/L (ref 135–145)
SPECIMEN SOURCE: NORMAL
TRIGL SERPL-MCNC: 126 MG/DL (ref 0–149)
TROPONIN T SERPL-MCNC: 13 NG/L (ref 6–19)
TROPONIN T SERPL-MCNC: 16 NG/L (ref 6–19)
WBC # BLD AUTO: 10.9 K/UL (ref 4.8–10.8)

## 2021-03-22 PROCEDURE — 96375 TX/PRO/DX INJ NEW DRUG ADDON: CPT | Mod: XU

## 2021-03-22 PROCEDURE — A9270 NON-COVERED ITEM OR SERVICE: HCPCS | Performed by: STUDENT IN AN ORGANIZED HEALTH CARE EDUCATION/TRAINING PROGRAM

## 2021-03-22 PROCEDURE — 93005 ELECTROCARDIOGRAM TRACING: CPT | Performed by: EMERGENCY MEDICINE

## 2021-03-22 PROCEDURE — 83880 ASSAY OF NATRIURETIC PEPTIDE: CPT

## 2021-03-22 PROCEDURE — 99220 PR INITIAL OBSERVATION CARE,LEVL III: CPT | Performed by: STUDENT IN AN ORGANIZED HEALTH CARE EDUCATION/TRAINING PROGRAM

## 2021-03-22 PROCEDURE — A9270 NON-COVERED ITEM OR SERVICE: HCPCS | Performed by: EMERGENCY MEDICINE

## 2021-03-22 PROCEDURE — 700111 HCHG RX REV CODE 636 W/ 250 OVERRIDE (IP): Performed by: STUDENT IN AN ORGANIZED HEALTH CARE EDUCATION/TRAINING PROGRAM

## 2021-03-22 PROCEDURE — RXMED WILLOW AMBULATORY MEDICATION CHARGE: Performed by: STUDENT IN AN ORGANIZED HEALTH CARE EDUCATION/TRAINING PROGRAM

## 2021-03-22 PROCEDURE — 80307 DRUG TEST PRSMV CHEM ANLYZR: CPT

## 2021-03-22 PROCEDURE — A9502 TC99M TETROFOSMIN: HCPCS

## 2021-03-22 PROCEDURE — 80061 LIPID PANEL: CPT

## 2021-03-22 PROCEDURE — 82962 GLUCOSE BLOOD TEST: CPT | Mod: 91

## 2021-03-22 PROCEDURE — 700111 HCHG RX REV CODE 636 W/ 250 OVERRIDE (IP): Performed by: EMERGENCY MEDICINE

## 2021-03-22 PROCEDURE — G0378 HOSPITAL OBSERVATION PER HR: HCPCS

## 2021-03-22 PROCEDURE — 71045 X-RAY EXAM CHEST 1 VIEW: CPT

## 2021-03-22 PROCEDURE — 99285 EMERGENCY DEPT VISIT HI MDM: CPT

## 2021-03-22 PROCEDURE — 700102 HCHG RX REV CODE 250 W/ 637 OVERRIDE(OP): Performed by: STUDENT IN AN ORGANIZED HEALTH CARE EDUCATION/TRAINING PROGRAM

## 2021-03-22 PROCEDURE — U0005 INFEC AGEN DETEC AMPLI PROBE: HCPCS

## 2021-03-22 PROCEDURE — 80048 BASIC METABOLIC PNL TOTAL CA: CPT

## 2021-03-22 PROCEDURE — 84484 ASSAY OF TROPONIN QUANT: CPT | Mod: 91

## 2021-03-22 PROCEDURE — 93306 TTE W/DOPPLER COMPLETE: CPT | Mod: 26 | Performed by: INTERNAL MEDICINE

## 2021-03-22 PROCEDURE — 93005 ELECTROCARDIOGRAM TRACING: CPT

## 2021-03-22 PROCEDURE — 700111 HCHG RX REV CODE 636 W/ 250 OVERRIDE (IP)

## 2021-03-22 PROCEDURE — 83036 HEMOGLOBIN GLYCOSYLATED A1C: CPT

## 2021-03-22 PROCEDURE — 85379 FIBRIN DEGRADATION QUANT: CPT

## 2021-03-22 PROCEDURE — 93306 TTE W/DOPPLER COMPLETE: CPT

## 2021-03-22 PROCEDURE — U0003 INFECTIOUS AGENT DETECTION BY NUCLEIC ACID (DNA OR RNA); SEVERE ACUTE RESPIRATORY SYNDROME CORONAVIRUS 2 (SARS-COV-2) (CORONAVIRUS DISEASE [COVID-19]), AMPLIFIED PROBE TECHNIQUE, MAKING USE OF HIGH THROUGHPUT TECHNOLOGIES AS DESCRIBED BY CMS-2020-01-R: HCPCS

## 2021-03-22 PROCEDURE — 70355 PANORAMIC X-RAY OF JAWS: CPT

## 2021-03-22 PROCEDURE — 85025 COMPLETE CBC W/AUTO DIFF WBC: CPT

## 2021-03-22 PROCEDURE — 700101 HCHG RX REV CODE 250: Performed by: STUDENT IN AN ORGANIZED HEALTH CARE EDUCATION/TRAINING PROGRAM

## 2021-03-22 PROCEDURE — 96374 THER/PROPH/DIAG INJ IV PUSH: CPT | Mod: XU

## 2021-03-22 PROCEDURE — 700102 HCHG RX REV CODE 250 W/ 637 OVERRIDE(OP): Performed by: EMERGENCY MEDICINE

## 2021-03-22 PROCEDURE — 36415 COLL VENOUS BLD VENIPUNCTURE: CPT

## 2021-03-22 PROCEDURE — 96372 THER/PROPH/DIAG INJ SC/IM: CPT | Mod: XU

## 2021-03-22 RX ORDER — AMOXICILLIN 250 MG
2 CAPSULE ORAL 2 TIMES DAILY
Status: DISCONTINUED | OUTPATIENT
Start: 2021-03-22 | End: 2021-03-24 | Stop reason: HOSPADM

## 2021-03-22 RX ORDER — CHOLECALCIFEROL (VITAMIN D3) 125 MCG
5 CAPSULE ORAL NIGHTLY
Status: DISCONTINUED | OUTPATIENT
Start: 2021-03-22 | End: 2021-03-24 | Stop reason: HOSPADM

## 2021-03-22 RX ORDER — POLYETHYLENE GLYCOL 3350 17 G/17G
1 POWDER, FOR SOLUTION ORAL
Status: DISCONTINUED | OUTPATIENT
Start: 2021-03-22 | End: 2021-03-24 | Stop reason: HOSPADM

## 2021-03-22 RX ORDER — LISINOPRIL 20 MG/1
20 TABLET ORAL DAILY
Qty: 30 TABLET | Refills: 0 | Status: SHIPPED | OUTPATIENT
Start: 2021-03-22 | End: 2021-04-23

## 2021-03-22 RX ORDER — PROMETHAZINE HYDROCHLORIDE 25 MG/1
12.5-25 SUPPOSITORY RECTAL EVERY 4 HOURS PRN
Status: DISCONTINUED | OUTPATIENT
Start: 2021-03-22 | End: 2021-03-24 | Stop reason: HOSPADM

## 2021-03-22 RX ORDER — CALCIUM CARBONATE 500 MG/1
500 TABLET, CHEWABLE ORAL 3 TIMES DAILY PRN
Status: DISCONTINUED | OUTPATIENT
Start: 2021-03-22 | End: 2021-03-24 | Stop reason: HOSPADM

## 2021-03-22 RX ORDER — LORAZEPAM 2 MG/ML
1 INJECTION INTRAMUSCULAR ONCE
Status: COMPLETED | OUTPATIENT
Start: 2021-03-22 | End: 2021-03-22

## 2021-03-22 RX ORDER — ONDANSETRON 4 MG/1
4 TABLET, ORALLY DISINTEGRATING ORAL EVERY 4 HOURS PRN
Status: DISCONTINUED | OUTPATIENT
Start: 2021-03-22 | End: 2021-03-24 | Stop reason: HOSPADM

## 2021-03-22 RX ORDER — ACETAMINOPHEN 325 MG/1
650 TABLET ORAL EVERY 6 HOURS PRN
Status: DISCONTINUED | OUTPATIENT
Start: 2021-03-22 | End: 2021-03-24 | Stop reason: HOSPADM

## 2021-03-22 RX ORDER — LABETALOL HYDROCHLORIDE 5 MG/ML
10 INJECTION, SOLUTION INTRAVENOUS ONCE
Status: COMPLETED | OUTPATIENT
Start: 2021-03-22 | End: 2021-03-22

## 2021-03-22 RX ORDER — ONDANSETRON 2 MG/ML
4 INJECTION INTRAMUSCULAR; INTRAVENOUS EVERY 4 HOURS PRN
Status: DISCONTINUED | OUTPATIENT
Start: 2021-03-22 | End: 2021-03-24 | Stop reason: HOSPADM

## 2021-03-22 RX ORDER — REGADENOSON 0.08 MG/ML
0.4 INJECTION, SOLUTION INTRAVENOUS ONCE
Status: COMPLETED | OUTPATIENT
Start: 2021-03-22 | End: 2021-03-22

## 2021-03-22 RX ORDER — LISINOPRIL 20 MG/1
20 TABLET ORAL
Status: DISCONTINUED | OUTPATIENT
Start: 2021-03-22 | End: 2021-03-24 | Stop reason: HOSPADM

## 2021-03-22 RX ORDER — PROMETHAZINE HYDROCHLORIDE 25 MG/1
12.5-25 TABLET ORAL EVERY 4 HOURS PRN
Status: DISCONTINUED | OUTPATIENT
Start: 2021-03-22 | End: 2021-03-24 | Stop reason: HOSPADM

## 2021-03-22 RX ORDER — PROCHLORPERAZINE EDISYLATE 5 MG/ML
5-10 INJECTION INTRAMUSCULAR; INTRAVENOUS EVERY 4 HOURS PRN
Status: DISCONTINUED | OUTPATIENT
Start: 2021-03-22 | End: 2021-03-24 | Stop reason: HOSPADM

## 2021-03-22 RX ORDER — ENALAPRILAT 1.25 MG/ML
1.25 INJECTION INTRAVENOUS EVERY 6 HOURS PRN
Status: DISCONTINUED | OUTPATIENT
Start: 2021-03-22 | End: 2021-03-24 | Stop reason: HOSPADM

## 2021-03-22 RX ORDER — ACETAMINOPHEN 325 MG/1
650 TABLET ORAL ONCE
Status: COMPLETED | OUTPATIENT
Start: 2021-03-22 | End: 2021-03-22

## 2021-03-22 RX ORDER — METOPROLOL TARTRATE 50 MG/1
100 TABLET, FILM COATED ORAL 2 TIMES DAILY
Status: DISCONTINUED | OUTPATIENT
Start: 2021-03-22 | End: 2021-03-22

## 2021-03-22 RX ORDER — METOPROLOL TARTRATE 50 MG/1
100 TABLET, FILM COATED ORAL 2 TIMES DAILY
Status: DISCONTINUED | OUTPATIENT
Start: 2021-03-22 | End: 2021-03-24

## 2021-03-22 RX ORDER — DULOXETIN HYDROCHLORIDE 60 MG/1
60 CAPSULE, DELAYED RELEASE ORAL DAILY
Status: DISCONTINUED | OUTPATIENT
Start: 2021-03-22 | End: 2021-03-24 | Stop reason: HOSPADM

## 2021-03-22 RX ORDER — REGADENOSON 0.08 MG/ML
INJECTION, SOLUTION INTRAVENOUS
Status: COMPLETED
Start: 2021-03-22 | End: 2021-03-22

## 2021-03-22 RX ORDER — DEXTROSE MONOHYDRATE 25 G/50ML
50 INJECTION, SOLUTION INTRAVENOUS
Status: DISCONTINUED | OUTPATIENT
Start: 2021-03-22 | End: 2021-03-24 | Stop reason: HOSPADM

## 2021-03-22 RX ORDER — ONDANSETRON 2 MG/ML
4 INJECTION INTRAMUSCULAR; INTRAVENOUS ONCE
Status: DISCONTINUED | OUTPATIENT
Start: 2021-03-22 | End: 2021-03-22

## 2021-03-22 RX ORDER — IBUPROFEN 600 MG/1
600 TABLET ORAL ONCE
Status: COMPLETED | OUTPATIENT
Start: 2021-03-22 | End: 2021-03-22

## 2021-03-22 RX ORDER — NICOTINE 21 MG/24HR
1 PATCH, TRANSDERMAL 24 HOURS TRANSDERMAL EVERY 24 HOURS
Qty: 7 PATCH | Refills: 0 | Status: SHIPPED | OUTPATIENT
Start: 2021-03-22 | End: 2021-03-29

## 2021-03-22 RX ORDER — HYDRALAZINE HYDROCHLORIDE 20 MG/ML
20 INJECTION INTRAMUSCULAR; INTRAVENOUS ONCE
Status: COMPLETED | OUTPATIENT
Start: 2021-03-22 | End: 2021-03-22

## 2021-03-22 RX ORDER — BISACODYL 10 MG
10 SUPPOSITORY, RECTAL RECTAL
Status: DISCONTINUED | OUTPATIENT
Start: 2021-03-22 | End: 2021-03-24 | Stop reason: HOSPADM

## 2021-03-22 RX ORDER — ASPIRIN 300 MG/1
300 SUPPOSITORY RECTAL DAILY
Status: DISCONTINUED | OUTPATIENT
Start: 2021-03-22 | End: 2021-03-23

## 2021-03-22 RX ORDER — MORPHINE SULFATE 4 MG/ML
4 INJECTION, SOLUTION INTRAMUSCULAR; INTRAVENOUS ONCE
Status: DISCONTINUED | OUTPATIENT
Start: 2021-03-22 | End: 2021-03-22

## 2021-03-22 RX ORDER — ATORVASTATIN CALCIUM 40 MG/1
40 TABLET, FILM COATED ORAL EVERY EVENING
Status: DISCONTINUED | OUTPATIENT
Start: 2021-03-22 | End: 2021-03-24 | Stop reason: HOSPADM

## 2021-03-22 RX ORDER — ASPIRIN 81 MG/1
324 TABLET, CHEWABLE ORAL DAILY
Status: DISCONTINUED | OUTPATIENT
Start: 2021-03-22 | End: 2021-03-23

## 2021-03-22 RX ORDER — ASPIRIN 325 MG
325 TABLET ORAL DAILY
Status: DISCONTINUED | OUTPATIENT
Start: 2021-03-22 | End: 2021-03-23

## 2021-03-22 RX ADMIN — ENALAPRILAT 1.25 MG: 1.25 INJECTION INTRAVENOUS at 09:14

## 2021-03-22 RX ADMIN — INSULIN LISPRO 2 UNITS: 100 INJECTION, SOLUTION INTRAVENOUS; SUBCUTANEOUS at 17:23

## 2021-03-22 RX ADMIN — IBUPROFEN 600 MG: 600 TABLET, FILM COATED ORAL at 04:27

## 2021-03-22 RX ADMIN — LISINOPRIL 20 MG: 20 TABLET ORAL at 10:39

## 2021-03-22 RX ADMIN — LORAZEPAM 1 MG: 2 INJECTION INTRAMUSCULAR; INTRAVENOUS at 05:29

## 2021-03-22 RX ADMIN — ACETAMINOPHEN 650 MG: 325 TABLET, FILM COATED ORAL at 04:27

## 2021-03-22 RX ADMIN — LABETALOL HYDROCHLORIDE 10 MG: 5 INJECTION, SOLUTION INTRAVENOUS at 08:12

## 2021-03-22 RX ADMIN — REGADENOSON 0.4 MG: 0.08 INJECTION, SOLUTION INTRAVENOUS at 13:57

## 2021-03-22 RX ADMIN — ASPIRIN 325 MG ORAL TABLET 325 MG: 325 PILL ORAL at 08:13

## 2021-03-22 RX ADMIN — ONDANSETRON 4 MG: 2 INJECTION INTRAMUSCULAR; INTRAVENOUS at 21:00

## 2021-03-22 RX ADMIN — DULOXETINE HYDROCHLORIDE 60 MG: 60 CAPSULE, DELAYED RELEASE ORAL at 12:41

## 2021-03-22 RX ADMIN — HYDRALAZINE HYDROCHLORIDE 20 MG: 20 INJECTION INTRAMUSCULAR; INTRAVENOUS at 04:38

## 2021-03-22 RX ADMIN — Medication 5 MG: at 20:58

## 2021-03-22 RX ADMIN — ACETAMINOPHEN 650 MG: 325 TABLET ORAL at 20:58

## 2021-03-22 RX ADMIN — DOCUSATE SODIUM 50 MG AND SENNOSIDES 8.6 MG 2 TABLET: 8.6; 5 TABLET, FILM COATED ORAL at 08:12

## 2021-03-22 RX ADMIN — ATORVASTATIN CALCIUM 40 MG: 40 TABLET, FILM COATED ORAL at 17:19

## 2021-03-22 RX ADMIN — METOPROLOL TARTRATE 100 MG: 50 TABLET, FILM COATED ORAL at 17:19

## 2021-03-22 RX ADMIN — CALCIUM CARBONATE 500 MG: 500 TABLET, CHEWABLE ORAL at 20:57

## 2021-03-22 ASSESSMENT — LIFESTYLE VARIABLES
ON A TYPICAL DAY WHEN YOU DRINK ALCOHOL HOW MANY DRINKS DO YOU HAVE: 0
HOW MANY TIMES IN THE PAST YEAR HAVE YOU HAD 5 OR MORE DRINKS IN A DAY: 0
HAVE PEOPLE ANNOYED YOU BY CRITICIZING YOUR DRINKING: NO
EVER FELT BAD OR GUILTY ABOUT YOUR DRINKING: NO
HAVE YOU EVER FELT YOU SHOULD CUT DOWN ON YOUR DRINKING: NO
DOES PATIENT WANT TO STOP DRINKING: NO
TOTAL SCORE: 0
EVER HAD A DRINK FIRST THING IN THE MORNING TO STEADY YOUR NERVES TO GET RID OF A HANGOVER: NO
TOTAL SCORE: 0
AVERAGE NUMBER OF DAYS PER WEEK YOU HAVE A DRINK CONTAINING ALCOHOL: 0
ALCOHOL_USE: NO
TOTAL SCORE: 0
CONSUMPTION TOTAL: NEGATIVE

## 2021-03-22 ASSESSMENT — COGNITIVE AND FUNCTIONAL STATUS - GENERAL
SUGGESTED CMS G CODE MODIFIER MOBILITY: CJ
MOVING FROM LYING ON BACK TO SITTING ON SIDE OF FLAT BED: A LITTLE
MOBILITY SCORE: 24
SUGGESTED CMS G CODE MODIFIER DAILY ACTIVITY: CH
SUGGESTED CMS G CODE MODIFIER MOBILITY: CH
WALKING IN HOSPITAL ROOM: A LITTLE
DAILY ACTIVITIY SCORE: 23
DAILY ACTIVITIY SCORE: 24
STANDING UP FROM CHAIR USING ARMS: A LITTLE
SUGGESTED CMS G CODE MODIFIER DAILY ACTIVITY: CI
CLIMB 3 TO 5 STEPS WITH RAILING: A LITTLE
MOBILITY SCORE: 20
DRESSING REGULAR LOWER BODY CLOTHING: A LITTLE

## 2021-03-22 ASSESSMENT — PATIENT HEALTH QUESTIONNAIRE - PHQ9
3. TROUBLE FALLING OR STAYING ASLEEP OR SLEEPING TOO MUCH: SEVERAL DAYS
6. FEELING BAD ABOUT YOURSELF - OR THAT YOU ARE A FAILURE OR HAVE LET YOURSELF OR YOUR FAMILY DOWN: NOT AL ALL
SUM OF ALL RESPONSES TO PHQ QUESTIONS 1-9: 7
7. TROUBLE CONCENTRATING ON THINGS, SUCH AS READING THE NEWSPAPER OR WATCHING TELEVISION: SEVERAL DAYS
SUM OF ALL RESPONSES TO PHQ9 QUESTIONS 1 AND 2: 2
8. MOVING OR SPEAKING SO SLOWLY THAT OTHER PEOPLE COULD HAVE NOTICED. OR THE OPPOSITE, BEING SO FIGETY OR RESTLESS THAT YOU HAVE BEEN MOVING AROUND A LOT MORE THAN USUAL: NOT AT ALL
1. LITTLE INTEREST OR PLEASURE IN DOING THINGS: SEVERAL DAYS
4. FEELING TIRED OR HAVING LITTLE ENERGY: NEARLY EVERY DAY
5. POOR APPETITE OR OVEREATING: NOT AT ALL
2. FEELING DOWN, DEPRESSED, IRRITABLE, OR HOPELESS: SEVERAL DAYS
9. THOUGHTS THAT YOU WOULD BE BETTER OFF DEAD, OR OF HURTING YOURSELF: NOT AT ALL

## 2021-03-22 ASSESSMENT — ENCOUNTER SYMPTOMS
CONSTIPATION: 0
ABDOMINAL PAIN: 0
COUGH: 0
SHORTNESS OF BREATH: 1
MYALGIAS: 0
FEVER: 0
NAUSEA: 0
BLOOD IN STOOL: 0
SPUTUM PRODUCTION: 0
VOMITING: 0
ORTHOPNEA: 0
DIZZINESS: 0
HEMOPTYSIS: 0
WEIGHT LOSS: 0
WEAKNESS: 1
PALPITATIONS: 1
DEPRESSION: 1
WHEEZING: 0
SORE THROAT: 0
LOSS OF CONSCIOUSNESS: 0
CHILLS: 0
BLURRED VISION: 0
DIARRHEA: 0
EYE PAIN: 0

## 2021-03-22 ASSESSMENT — FIBROSIS 4 INDEX
FIB4 SCORE: 2.05
FIB4 SCORE: 2.59

## 2021-03-22 NOTE — H&P
Hospital Medicine History & Physical Note    Date of Service  3/22/2021    Primary Care Physician  SHEBA Oswald.    Consultants  None    Code Status  Full Code    Chief Complaint  Chief Complaint   Patient presents with   • Chest Pain     occurs upon ambulation, intermittent mid sternal pain   • Headache   • Indigestion       History of Presenting Illness  56 y.o. female who presented 3/22/2021 with complaints of pressure-like/burning chest pain.  Difficult to obtain history as patient is very tangential and some arguing with  at bedside.  She is also very tearful and loses train of thought easily.  Per patient, she has been having issues with recurrent pressure/burning-like chest pain along the left side of her chest that radiates under her ribs.  She reports that the pain lasts roughly 2 to 5 minutes and usually resolves, is associated with shortness of breath.  She states that it usually happens about 1 time per week, for the past week it is happening daily.  She denies any prior history of heart disease in the past.  Does have history of hyperlipidemia and diabetes but she is not on medications at this time.  Today, patient reports the pain has been on and off for several hours she additionally has a headache.  It does not appear to be associated with exertion, she does not know of any aggravating or relieving factors at this time.  She denies any fevers, chills, cough, GI/ complaints.  Patient does report mouth pain from dental caries as well as bilateral lower extremity leg pain which she reports has been ongoing for years.  Distally, there was concern that patient may have some recent methamphetamine abuse.  She reports that she has history of alcohol abuse and polysubstance abuse in the past but has been sober.  However, patient admits that she did relapse and use methamphetamines 1 time last week.  She is adamant that she has not used again since that time, and it was only the one  time.    ED: HR 10 2-87, /110->218/100->207/84. WBC 10.9, Glucose 245, , Trop 13.  U tox positive for CBD.  CXR unremarkable. XR mandible w/ dental carries. ECG w/ 1st degree av block, and borderline T abnormalities.  When questioned regarding her hypertension patient reports that usually elevated but usually not this high.  She is currently on metoprolol 100 mg twice daily, and has taken yesterday morning's dose but not the evening dose.  Patient was given Tylenol 650 mg, ibuprofen 600 mg, hydralazine 20 mg IV x1, and Ativan 1 mg.    Review of Systems  Review of Systems   Constitutional: Positive for malaise/fatigue. Negative for chills, fever and weight loss.   HENT: Negative for hearing loss and sore throat.    Eyes: Negative for blurred vision and pain.   Respiratory: Positive for shortness of breath. Negative for cough, hemoptysis, sputum production and wheezing.    Cardiovascular: Positive for chest pain and palpitations. Negative for orthopnea and leg swelling.   Gastrointestinal: Negative for abdominal pain, blood in stool, constipation, diarrhea, nausea and vomiting.   Genitourinary: Negative for dysuria and hematuria.   Musculoskeletal: Negative for joint pain and myalgias.        B/l leg pain (chronic, years)   Skin: Negative for rash.   Neurological: Positive for weakness (Diffuse). Negative for dizziness and loss of consciousness.   Psychiatric/Behavioral: Positive for depression.        Bipolar/manic       Past Medical History   has a past medical history of Chronic low back pain (5/19/2016), Hip pain, right, Hypertension, Impaired fasting glucose (5/19/2016), Polyarthralgia (5/19/2016), RLS (restless legs syndrome) (5/19/2016), Vaginal discharge (5/19/2016), and Vertigo.    Surgical History   has a past surgical history that includes other orthopedic surgery and gyn surgery.     Family History  family history includes Diabetes in an other family member; Heart Attack in an other family  member; Thyroid in an other family member.     Social History   reports that she has been smoking. She has a 8.00 pack-year smoking history. She has never used smokeless tobacco. She reports that she does not drink alcohol and does not use drugs.    Allergies  No Known Allergies    Medications  Prior to Admission Medications   Prescriptions Last Dose Informant Patient Reported? Taking?   CHANTIX STARTING MONTH SHARAD 0.5 MG X 11 & 1 MG X 42 tablet  Patient No No   Sig: USE AS DIRECTED   Patient not taking: Reported on 3/13/2021   acetaminophen (TYLENOL) 500 MG Tab  Patient Yes No   Sig: Take 1,000-1,500 mg by mouth every 3 hours as needed.   duloxetine (CYMBALTA) 60 MG Cap DR Particles delayed-release capsule  Patient No No   Sig: TAKE 1 CAPSULE BY MOUTH EVERY DAY   Patient not taking: Reported on 3/13/2021   metoprolol (LOPRESSOR) 50 MG Tab  Patient No No   Sig: TAKE 1 AND 1/2 TABLETS BY MOUTH TWICE DAILY   Patient taking differently: 100 mg 2 times a day.      Facility-Administered Medications: None       Physical Exam  Temp:  [36 °C (96.8 °F)] 36 °C (96.8 °F)  Pulse:  [] 101  Resp:  [15-18] 18  BP: (165-218)/() 165/73  SpO2:  [97 %-100 %] 98 %    Physical Exam  Vitals and nursing note reviewed.   Constitutional:       General: She is not in acute distress.     Appearance: Normal appearance. She is not ill-appearing.   HENT:      Mouth/Throat:      Mouth: Mucous membranes are dry.   Eyes:      Extraocular Movements: Extraocular movements intact.   Cardiovascular:      Rate and Rhythm: Normal rate and regular rhythm.      Pulses: Normal pulses.      Heart sounds: Normal heart sounds. No murmur. No gallop.    Pulmonary:      Effort: Pulmonary effort is normal. No respiratory distress.      Breath sounds: No wheezing, rhonchi or rales.   Abdominal:      General: Abdomen is flat. Bowel sounds are normal.      Palpations: Abdomen is soft.      Tenderness: There is no abdominal tenderness.   Musculoskeletal:          General: No deformity. Normal range of motion.      Right lower leg: No edema.      Left lower leg: No edema.   Skin:     General: Skin is warm and dry.      Coloration: Skin is not jaundiced.      Findings: No rash.   Neurological:      General: No focal deficit present.      Mental Status: She is alert and oriented to person, place, and time.      Motor: No weakness.   Psychiatric:         Mood and Affect: Mood normal.      Comments: Tearful, erratic         Laboratory:  Recent Labs     03/22/21  0338   WBC 10.9*   RBC 5.03   HEMOGLOBIN 15.5   HEMATOCRIT 45.9   MCV 91.3   MCH 30.8   MCHC 33.8   RDW 41.4   PLATELETCT 273   MPV 10.0     Recent Labs     03/22/21  0338   SODIUM 137   POTASSIUM 3.7   CHLORIDE 99   CO2 21   GLUCOSE 245*   BUN 17   CREATININE 0.69   CALCIUM 9.9     Recent Labs     03/22/21  0338   GLUCOSE 245*         Recent Labs     03/22/21  0338   NTPROBNP 590*     Recent Labs     03/22/21  0338   TRIGLYCERIDE 126   HDL 34*   *     Recent Labs     03/22/21  0338   TROPONINT 13       Imaging:  [id number]   Final Result      Minimal periapical lucencies surrounding the lateral incisor and canine in the left mandible.      Dental caries involving the left mandibular canine.         DX-CHEST-PORTABLE (1 VIEW)   Final Result      No acute cardiopulmonary process is seen.      Atherosclerotic plaque.      NM-CARDIAC STRESS TEST    (Results Pending)   EC-ECHOCARDIOGRAM COMPLETE W/O CONT    (Results Pending)         Assessment/Plan:  I anticipate this patient is appropriate for observation status at this time.    * Chest pain- (present on admission)  Assessment & Plan  Recurrent chest pain for many months, more frequent x1 week  Last 2-5 minutes, associated w/ SOB. Not worsened with exertion  History of HTN, DM, HLD. Not on medications for DM or HLD  History of polysubstance and alcohol abuse. States sober. But recently used meth 1 week ago  Utox negative for methamphetamine  S/p  Tyelnol 650mg and Ibuprofen 600mg  - tele monitor  - Stress test  - f/u A1c and lipid panel  - TTE  - analgesics for symptom management      Elevated brain natriuretic peptide (BNP) level- (present on admission)  Assessment & Plan  TTE    Hypertensive urgency- (present on admission)  Assessment & Plan  Utox negative for amphetamines.  - Restart home Metop 100mg BID stat  - IV vasotec 1.25mg q6h PRN added  - Monitor on tele monitor  - TTE already ordered for eleavted BNP    DM (diabetes mellitus) (HCC)- (present on admission)  Assessment & Plan  Glucose 245  Reports not on DM medications  - ISS  - A1c and lipid panel    Tobacco abuse- (present on admission)  Assessment & Plan  Smoking education discussed and assistance in smoking cessation offered >5 minutes      Hypercholesterolemia- (present on admission)  Assessment & Plan  Lipid panel  Not on statin therapy at this time    VTE: SCD, Lovenox

## 2021-03-22 NOTE — ED NOTES
Pt states she wants the doctor to know that her pcf wanted her to get a scan of her mouth because she has abcess in her mouth that she is taking antibiotics for, erp aware

## 2021-03-22 NOTE — PROGRESS NOTES
Received report from NOC shift nurse. Assumed care of patient at 0700. Received bedside report. PT is A&Ox4. PT is on room air. Tele box in place and verified by monitor room. No signs of distress at this time. Call light within reach. Bed in lowest and locked position. 2 bed rails up. All questions answered appropriately. Will continue plan of care.

## 2021-03-22 NOTE — ED PROVIDER NOTES
ED Provider Note        Primary care provider: SHEBA Oswald.    I verified that the patient was wearing a mask and I was wearing appropriate PPE every time I entered the room. The patient's mask was on the patient at all times during my encounter except for a brief view of the oropharynx.      CHIEF COMPLAINT  Chief Complaint   Patient presents with   • Chest Pain     occurs upon ambulation, intermittent mid sternal pain   • Headache   • Indigestion       HPI  Cheyanne Lindsey is a 56 y.o. female who presents to the Emergency Department with chief complaint of multiple complaints.  Patient reports that prior to arrival she had some chest pain and some burning within her chest she reports that with minimal exertion she felt as though this pain got worse she also reports that with exertion she felt very tired and she reports that she gets a severe burning sensation throughout both of her extremities.  Has been smoking more than usual today.  Pain is been on and off for several hours she reports minimal headache that is improving currently her pain is improved she is not having any current shortness of breath but does state that she was short of breath earlier.  No fevers no chills no cough no respiratory complaints no problems with urination or bowel movements no other acute symptoms or concerns at this time.    REVIEW OF SYSTEMS  10 systems reviewed and otherwise negative, pertinent positives and negatives listed in the history of present illness.    PAST MEDICAL HISTORY   has a past medical history of Chronic low back pain (5/19/2016), Hip pain, right, Hypertension, Impaired fasting glucose (5/19/2016), Polyarthralgia (5/19/2016), RLS (restless legs syndrome) (5/19/2016), Vaginal discharge (5/19/2016), and Vertigo.    SURGICAL HISTORY   has a past surgical history that includes other orthopedic surgery and gyn surgery.    SOCIAL HISTORY  Social History     Tobacco Use   • Smoking status: Current Some  Day Smoker     Packs/day: 0.25     Years: 32.00     Pack years: 8.00     Last attempt to quit: 10/24/2010     Years since quitting: 10.4   • Smokeless tobacco: Never Used   Substance Use Topics   • Alcohol use: No     Alcohol/week: 0.0 oz     Comment: QUIT IN 1989   • Drug use: No      Social History     Substance and Sexual Activity   Drug Use No       FAMILY HISTORY  Non-Contributory    CURRENT MEDICATIONS  Home Medications    **Home medications have not yet been reviewed for this encounter**         ALLERGIES  No Known Allergies    PHYSICAL EXAM  VITAL SIGNS: BP (!) 174/110   Pulse (!) 102   Temp 36 °C (96.8 °F) (Temporal)   Resp 18   Wt 71.2 kg (156 lb 15.5 oz)   LMP 10/04/2011   SpO2 98%   BMI 24.58 kg/m²   Pulse ox interpretation: I interpret this pulse ox as normal.  Constitutional: Alert and oriented x 3, minimal distress  HEENT: Atraumatic normocephalic, pupils are equal round reactive to light extraocular movements are intact. The nares is clear, external ears are normal, mouth shows moist mucous membranes  Neck: Supple, no JVD no tracheal deviation  Cardiovascular: Borderline tachycardic murmur rub or gallop 2+ pulses peripherally x4  Thorax & Lungs: No respiratory distress, no wheezes rales or rhonchi, No chest tenderness.   GI: Soft nontender nondistended positive bowel sounds, no peritoneal signs  Skin: Warm dry no acute rash or lesion  Musculoskeletal: Moving all extremities with full range and 5 of 5 strength, no acute  deformity  Neurologic: Cranial nerves III through XII are grossly intact, no sensory deficit, no cerebellar dysfunction   Psychiatric: Appropriate affect for situation at this time      DIAGNOSTIC STUDIES / PROCEDURES  LABS      Results for orders placed or performed during the hospital encounter of 03/22/21   URINE DRUG SCREEN   Result Value Ref Range    Amphetamines Urine Negative Negative    Barbiturates Negative Negative    Benzodiazepines Negative Negative    Cocaine  Metabolite Negative Negative    Methadone Negative Negative    Opiates Negative Negative    Oxycodone Negative Negative    Phencyclidine -Pcp Negative Negative    Propoxyphene Negative Negative    Cannabinoid Metab Positive (A) Negative   CBC w/ Differential   Result Value Ref Range    WBC 10.9 (H) 4.8 - 10.8 K/uL    RBC 5.03 4.20 - 5.40 M/uL    Hemoglobin 15.5 12.0 - 16.0 g/dL    Hematocrit 45.9 37.0 - 47.0 %    MCV 91.3 81.4 - 97.8 fL    MCH 30.8 27.0 - 33.0 pg    MCHC 33.8 33.6 - 35.0 g/dL    RDW 41.4 35.9 - 50.0 fL    Platelet Count 273 164 - 446 K/uL    MPV 10.0 9.0 - 12.9 fL    Neutrophils-Polys 71.30 44.00 - 72.00 %    Lymphocytes 20.00 (L) 22.00 - 41.00 %    Monocytes 6.50 0.00 - 13.40 %    Eosinophils 1.20 0.00 - 6.90 %    Basophils 0.50 0.00 - 1.80 %    Immature Granulocytes 0.50 0.00 - 0.90 %    Nucleated RBC 0.00 /100 WBC    Neutrophils (Absolute) 7.79 (H) 2.00 - 7.15 K/uL    Lymphs (Absolute) 2.19 1.00 - 4.80 K/uL    Monos (Absolute) 0.71 0.00 - 0.85 K/uL    Eos (Absolute) 0.13 0.00 - 0.51 K/uL    Baso (Absolute) 0.06 0.00 - 0.12 K/uL    Immature Granulocytes (abs) 0.06 0.00 - 0.11 K/uL    NRBC (Absolute) 0.00 K/uL   Basic Metabolic Panel (BMP)   Result Value Ref Range    Sodium 137 135 - 145 mmol/L    Potassium 3.7 3.6 - 5.5 mmol/L    Chloride 99 96 - 112 mmol/L    Co2 21 20 - 33 mmol/L    Glucose 245 (H) 65 - 99 mg/dL    Bun 17 8 - 22 mg/dL    Creatinine 0.69 0.50 - 1.40 mg/dL    Calcium 9.9 8.5 - 10.5 mg/dL    Anion Gap 17.0 (H) 7.0 - 16.0   proBrain Natriuretic Peptide, NT   Result Value Ref Range    NT-proBNP 590 (H) 0 - 125 pg/mL   Troponin STAT   Result Value Ref Range    Troponin T 13 6 - 19 ng/L   ESTIMATED GFR   Result Value Ref Range    GFR If African American >60 >60 mL/min/1.73 m 2    GFR If Non African American >60 >60 mL/min/1.73 m 2   EKG   Result Value Ref Range    Report       [facility]    Test Date:  2021-03-22  Pt Name:    JOANNE JOHNSON                Department: ER  MRN:        [id number]                      Room:       RD 03  Gender:     Female                       Technician: [id number]  :        1965                   Requested By:ER TRIAGE PROTOCOL  Order #:    [id number]                    Reading MD: JODI JOHNSTON MD    Measurements  Intervals                                Axis  Rate:       86                           P:          59  AK:         216                          QRS:        71  QRSD:       78                           T:          -30  QT:         404  QTc:        484    Interpretive Statements  SINUS RHYTHM  FIRST DEGREE AV BLOCK  CONSIDER LEFT VENTRICULAR HYPERTROPHY  BORDERLINE T ABNORMALITIES, INFERIOR LEADS  Compared to ECG 2013 14:25:22  First degree AV block now present  T-wave abnormality now present  Short AK interval no longer present  Electronically Signed On 3- 4:01:17  PDT by JODI JOHNSTON MD         All labs reviewed by me.      RADIOLOGY  [id number]   Final Result      Minimal periapical lucencies surrounding the lateral incisor and canine in the left mandible.      Dental caries involving the left mandibular canine.         DX-CHEST-PORTABLE (1 VIEW)   Final Result      No acute cardiopulmonary process is seen.      Atherosclerotic plaque.      NM-CARDIAC STRESS TEST    (Results Pending)   EC-ECHOCARDIOGRAM COMPLETE W/O CONT    (Results Pending)     The radiologist's interpretation of all radiological studies have been reviewed by me.    COURSE & MEDICAL DECISION MAKING  Pertinent Labs & Imaging studies reviewed. (See chart for details)    3:25 AM - Patient seen and examined at bedside.       Medical Decision Making: Patient remains hypertensive despite interventions.  She complains that she has had recent dental infections and concern that she may have abscess mandibular Panorex shows some very minimal lucencies and physical exam does not suggest large abscess do not think she requires  further antibiotics at this time.  Her troponin is negative her EKG is nonischemic there is question whether she may have abused methamphetamine as  has voiced concerns of this urine drug screen pending I discussed case with hospitalist Dr. Rodney and patient is admitted for further evaluation and treatment    BP (!) 197/88   Pulse (!) 107   Temp 36 °C (96.8 °F) (Temporal)   Resp 18   Wt 71.2 kg (156 lb 15.5 oz)   LMP 10/04/2011   SpO2 98%   BMI 24.58 kg/m²           FINAL IMPRESSION  1. Chest pain, unspecified type Active   2. Hypertensive urgency Active         This dictation has been created using voice recognition software and/or scribes. The accuracy of the dictation is limited by the abilities of the software and the expertise of the scribes. I expect there may be some errors of grammar and possibly content. I made every attempt to manually correct the errors within my dictation. However, errors related to voice recognition software and/or scribes may still exist and should be interpreted within the appropriate context.

## 2021-03-22 NOTE — ED NOTES
"Pt stated she \"is here for the same thing as last time.\" Refuses to state symptoms and says she is \"done talking.\"  "

## 2021-03-22 NOTE — ED NOTES
Pt states she has chest pain with ambulation that turns into a headache and weakness all over her lower legs

## 2021-03-22 NOTE — PROGRESS NOTES
Patient was admitted earlier today.     56 y.o. female who presented 3/22/2021 with complaints of pressure-like/burning chest pain.  She has been having issues with  recurrent pressure/burning-like chest pain along the left side of her chest that radiates under her ribs. She denies any prior history of heart disease in the past. She also reports long standing history of BLE pain with weakness. Reports mouth pain from dental caries.    Endorse smoking and polysubstance use one time recently. She has hx of polysubstance use in the past, but has been sober for years.     UDS pos jethrobis  Trop trending normal  CXR personally reviewed: normal, No acute cardiopulmonary process  EKG personally reviewed: NSR, no acute ST changes  Lipid profile reviewed: , HDL 34  A1c 6.9    Plans  Echo  Stress test  PT/OT  Add lisinopril 20mg daily for HTN with vasotec prn  Add lipitor   Tele  Smoking and polysubstance cessation advised with patient, she voiced understanding.

## 2021-03-22 NOTE — PROGRESS NOTES
2 RN skin check completed with Lo.   Devices in place Tele Box.  Skin assessed under devices Yes.  Confirmed pressure ulcers found on N/A.  New potential pressure ulcers noted on N/A.   Wound consult placed: Not necessary at this time.    Skin is intact.   Old scab on top of right foot.   Scattered scabs and bruising on upper and lower extremities.   Dry heels.

## 2021-03-22 NOTE — PROGRESS NOTES
Patient back on floor. Monitor room notified. Call light within reach. Bed locked and in lowest position. All questions answered. Will continue plan of care.

## 2021-03-22 NOTE — ASSESSMENT & PLAN NOTE
Utox negative for amphetamines.  - Restart home Metop 100mg BID stat  - added lisinopril  BP improved but creat increased. Start IVF and recheck creat tomorrow

## 2021-03-22 NOTE — CARE PLAN
Problem: Safety  Goal: Will remain free from falls  Outcome: PROGRESSING AS EXPECTED   Patient's risk for injury and falls assessed. Appropriate safety precautions in place. Patient educated to utilize call light for needs. Patient verbalizes understanding.     Problem: Knowledge Deficit  Goal: Knowledge of the prescribed therapeutic regimen will improve  Outcome: PROGRESSING AS EXPECTED  Patient is educated of disease process and condition. Treatment team has included patient in plan of care. All medications indications and side effects are explained. Patient is encouraged to ask questions. Patient indicates understanding.

## 2021-03-22 NOTE — ASSESSMENT & PLAN NOTE
A1c 6.9%  Not on medications  If renal function improves, will consider metformin  DM diet education ordered  ISS

## 2021-03-22 NOTE — ED TRIAGE NOTES
Chief Complaint   Patient presents with   • Chest Pain     occurs upon ambulation, intermittent mid sternal pain   • Headache   • Indigestion     Pt refuses to tell this RN what all her complaints are in triage but states she has many more complaints than what she has stated. Recently relapsed on Meth.     Patient states CP goes away when she sits down and stops walking.     BP (!) 174/110   Pulse (!) 102   Temp 36 °C (96.8 °F) (Temporal)   Resp 18   LMP 10/04/2011   SpO2 98%

## 2021-03-22 NOTE — PROGRESS NOTES
Pt on unit via [employer]. Alert and oriented x4. Able to ambulate to bed x1 standby assist.tele monitor applied.  Denies dizziness at this time. Resting quietly in bed.

## 2021-03-22 NOTE — ASSESSMENT & PLAN NOTE
History of HTN, DM, HLD. Not on medications for DM or HLD  History of polysubstance and alcohol abuse. States sober. But recently used meth 1 week ago  Utox negative for methamphetamine  Stress test neg and TTE normal  Continue aspirin and statin  CP improved

## 2021-03-23 ENCOUNTER — APPOINTMENT (OUTPATIENT)
Dept: RADIOLOGY | Facility: MEDICAL CENTER | Age: 56
End: 2021-03-23
Attending: INTERNAL MEDICINE
Payer: MEDICARE

## 2021-03-23 PROBLEM — M79.605 PAIN IN BOTH LOWER EXTREMITIES: Status: ACTIVE | Noted: 2021-03-23

## 2021-03-23 PROBLEM — M79.604 PAIN IN BOTH LOWER EXTREMITIES: Status: ACTIVE | Noted: 2021-03-23

## 2021-03-23 LAB
ANION GAP SERPL CALC-SCNC: 16 MMOL/L (ref 7–16)
APPEARANCE UR: ABNORMAL
BACTERIA #/AREA URNS HPF: ABNORMAL /HPF
BASOPHILS # BLD AUTO: 0.6 % (ref 0–1.8)
BASOPHILS # BLD: 0.06 K/UL (ref 0–0.12)
BILIRUB UR QL STRIP.AUTO: NEGATIVE
BUN SERPL-MCNC: 21 MG/DL (ref 8–22)
CALCIUM SERPL-MCNC: 10.1 MG/DL (ref 8.5–10.5)
CHLORIDE SERPL-SCNC: 98 MMOL/L (ref 96–112)
CO2 SERPL-SCNC: 22 MMOL/L (ref 20–33)
COLOR UR: YELLOW
CREAT SERPL-MCNC: 1.67 MG/DL (ref 0.5–1.4)
CREAT UR-MCNC: 207.74 MG/DL
EOSINOPHIL # BLD AUTO: 0.17 K/UL (ref 0–0.51)
EOSINOPHIL NFR BLD: 1.7 % (ref 0–6.9)
EPI CELLS #/AREA URNS HPF: ABNORMAL /HPF
ERYTHROCYTE [DISTWIDTH] IN BLOOD BY AUTOMATED COUNT: 44 FL (ref 35.9–50)
GLUCOSE BLD-MCNC: 114 MG/DL (ref 65–99)
GLUCOSE BLD-MCNC: 116 MG/DL (ref 65–99)
GLUCOSE BLD-MCNC: 131 MG/DL (ref 65–99)
GLUCOSE BLD-MCNC: 172 MG/DL (ref 65–99)
GLUCOSE SERPL-MCNC: 134 MG/DL (ref 65–99)
GLUCOSE UR STRIP.AUTO-MCNC: NEGATIVE MG/DL
HCT VFR BLD AUTO: 44.6 % (ref 37–47)
HGB BLD-MCNC: 14.9 G/DL (ref 12–16)
HYALINE CASTS #/AREA URNS LPF: ABNORMAL /LPF
IMM GRANULOCYTES # BLD AUTO: 0.05 K/UL (ref 0–0.11)
IMM GRANULOCYTES NFR BLD AUTO: 0.5 % (ref 0–0.9)
KETONES UR STRIP.AUTO-MCNC: ABNORMAL MG/DL
LEUKOCYTE ESTERASE UR QL STRIP.AUTO: ABNORMAL
LYMPHOCYTES # BLD AUTO: 2.47 K/UL (ref 1–4.8)
LYMPHOCYTES NFR BLD: 25.4 % (ref 22–41)
MAGNESIUM SERPL-MCNC: 1.9 MG/DL (ref 1.5–2.5)
MCH RBC QN AUTO: 31 PG (ref 27–33)
MCHC RBC AUTO-ENTMCNC: 33.4 G/DL (ref 33.6–35)
MCV RBC AUTO: 92.7 FL (ref 81.4–97.8)
MICRO URNS: ABNORMAL
MONOCYTES # BLD AUTO: 0.86 K/UL (ref 0–0.85)
MONOCYTES NFR BLD AUTO: 8.8 % (ref 0–13.4)
NEUTROPHILS # BLD AUTO: 6.12 K/UL (ref 2–7.15)
NEUTROPHILS NFR BLD: 63 % (ref 44–72)
NITRITE UR QL STRIP.AUTO: NEGATIVE
NRBC # BLD AUTO: 0 K/UL
NRBC BLD-RTO: 0 /100 WBC
PH UR STRIP.AUTO: 5 [PH] (ref 5–8)
PLATELET # BLD AUTO: 279 K/UL (ref 164–446)
PMV BLD AUTO: 9.8 FL (ref 9–12.9)
POTASSIUM SERPL-SCNC: 4 MMOL/L (ref 3.6–5.5)
PROT UR QL STRIP: NEGATIVE MG/DL
RBC # BLD AUTO: 4.81 M/UL (ref 4.2–5.4)
RBC # URNS HPF: ABNORMAL /HPF
RBC UR QL AUTO: NEGATIVE
SODIUM SERPL-SCNC: 136 MMOL/L (ref 135–145)
SODIUM UR-SCNC: <20 MMOL/L
SP GR UR STRIP.AUTO: 1.02
UROBILINOGEN UR STRIP.AUTO-MCNC: 0.2 MG/DL
VIT B12 SERPL-MCNC: 1257 PG/ML (ref 211–911)
WBC # BLD AUTO: 9.7 K/UL (ref 4.8–10.8)
WBC #/AREA URNS HPF: ABNORMAL /HPF

## 2021-03-23 PROCEDURE — 93922 UPR/L XTREMITY ART 2 LEVELS: CPT | Mod: 26 | Performed by: INTERNAL MEDICINE

## 2021-03-23 PROCEDURE — 93922 UPR/L XTREMITY ART 2 LEVELS: CPT

## 2021-03-23 PROCEDURE — G0378 HOSPITAL OBSERVATION PER HR: HCPCS

## 2021-03-23 PROCEDURE — 82962 GLUCOSE BLOOD TEST: CPT | Mod: 91

## 2021-03-23 PROCEDURE — 700102 HCHG RX REV CODE 250 W/ 637 OVERRIDE(OP): Performed by: STUDENT IN AN ORGANIZED HEALTH CARE EDUCATION/TRAINING PROGRAM

## 2021-03-23 PROCEDURE — 83735 ASSAY OF MAGNESIUM: CPT

## 2021-03-23 PROCEDURE — 96372 THER/PROPH/DIAG INJ SC/IM: CPT

## 2021-03-23 PROCEDURE — 700102 HCHG RX REV CODE 250 W/ 637 OVERRIDE(OP): Performed by: INTERNAL MEDICINE

## 2021-03-23 PROCEDURE — 700105 HCHG RX REV CODE 258: Performed by: INTERNAL MEDICINE

## 2021-03-23 PROCEDURE — 84300 ASSAY OF URINE SODIUM: CPT

## 2021-03-23 PROCEDURE — 81001 URINALYSIS AUTO W/SCOPE: CPT

## 2021-03-23 PROCEDURE — 82570 ASSAY OF URINE CREATININE: CPT

## 2021-03-23 PROCEDURE — 85025 COMPLETE CBC W/AUTO DIFF WBC: CPT

## 2021-03-23 PROCEDURE — 700111 HCHG RX REV CODE 636 W/ 250 OVERRIDE (IP): Performed by: STUDENT IN AN ORGANIZED HEALTH CARE EDUCATION/TRAINING PROGRAM

## 2021-03-23 PROCEDURE — A9270 NON-COVERED ITEM OR SERVICE: HCPCS | Performed by: STUDENT IN AN ORGANIZED HEALTH CARE EDUCATION/TRAINING PROGRAM

## 2021-03-23 PROCEDURE — A9270 NON-COVERED ITEM OR SERVICE: HCPCS | Performed by: INTERNAL MEDICINE

## 2021-03-23 PROCEDURE — 93925 LOWER EXTREMITY STUDY: CPT

## 2021-03-23 PROCEDURE — 82607 VITAMIN B-12: CPT

## 2021-03-23 PROCEDURE — 96376 TX/PRO/DX INJ SAME DRUG ADON: CPT

## 2021-03-23 PROCEDURE — 700101 HCHG RX REV CODE 250: Performed by: INTERNAL MEDICINE

## 2021-03-23 PROCEDURE — 93925 LOWER EXTREMITY STUDY: CPT | Mod: 26 | Performed by: INTERNAL MEDICINE

## 2021-03-23 PROCEDURE — 99226 PR SUBSEQUENT OBSERVATION CARE,LEVEL III: CPT | Performed by: INTERNAL MEDICINE

## 2021-03-23 PROCEDURE — 80048 BASIC METABOLIC PNL TOTAL CA: CPT

## 2021-03-23 RX ORDER — ASPIRIN 81 MG/1
81 TABLET, CHEWABLE ORAL DAILY
Status: DISCONTINUED | OUTPATIENT
Start: 2021-03-24 | End: 2021-03-24 | Stop reason: HOSPADM

## 2021-03-23 RX ORDER — SODIUM CHLORIDE, SODIUM LACTATE, POTASSIUM CHLORIDE, CALCIUM CHLORIDE 600; 310; 30; 20 MG/100ML; MG/100ML; MG/100ML; MG/100ML
INJECTION, SOLUTION INTRAVENOUS CONTINUOUS
Status: DISCONTINUED | OUTPATIENT
Start: 2021-03-23 | End: 2021-03-24 | Stop reason: HOSPADM

## 2021-03-23 RX ORDER — LIDOCAINE HYDROCHLORIDE 20 MG/ML
15 SOLUTION OROPHARYNGEAL EVERY 4 HOURS PRN
Status: DISCONTINUED | OUTPATIENT
Start: 2021-03-23 | End: 2021-03-24 | Stop reason: HOSPADM

## 2021-03-23 RX ORDER — HYDROXYZINE HYDROCHLORIDE 10 MG/1
10 TABLET, FILM COATED ORAL 3 TIMES DAILY PRN
Status: DISCONTINUED | OUTPATIENT
Start: 2021-03-23 | End: 2021-03-24 | Stop reason: HOSPADM

## 2021-03-23 RX ADMIN — SODIUM CHLORIDE, POTASSIUM CHLORIDE, SODIUM LACTATE AND CALCIUM CHLORIDE: 600; 310; 30; 20 INJECTION, SOLUTION INTRAVENOUS at 08:11

## 2021-03-23 RX ADMIN — ATORVASTATIN CALCIUM 40 MG: 40 TABLET, FILM COATED ORAL at 17:11

## 2021-03-23 RX ADMIN — ENALAPRILAT 1.25 MG: 1.25 INJECTION INTRAVENOUS at 19:40

## 2021-03-23 RX ADMIN — Medication 5 MG: at 21:24

## 2021-03-23 RX ADMIN — DULOXETINE HYDROCHLORIDE 60 MG: 60 CAPSULE, DELAYED RELEASE ORAL at 05:56

## 2021-03-23 RX ADMIN — CALCIUM CARBONATE 500 MG: 500 TABLET, CHEWABLE ORAL at 17:11

## 2021-03-23 RX ADMIN — ACETAMINOPHEN 650 MG: 325 TABLET ORAL at 21:24

## 2021-03-23 RX ADMIN — INSULIN LISPRO 2 UNITS: 100 INJECTION, SOLUTION INTRAVENOUS; SUBCUTANEOUS at 06:01

## 2021-03-23 RX ADMIN — ONDANSETRON 4 MG: 4 TABLET, ORALLY DISINTEGRATING ORAL at 08:37

## 2021-03-23 RX ADMIN — DOCUSATE SODIUM 50 MG AND SENNOSIDES 8.6 MG 2 TABLET: 8.6; 5 TABLET, FILM COATED ORAL at 17:11

## 2021-03-23 RX ADMIN — HYDROXYZINE HYDROCHLORIDE 10 MG: 10 TABLET, FILM COATED ORAL at 15:12

## 2021-03-23 RX ADMIN — ASPIRIN 325 MG ORAL TABLET 325 MG: 325 PILL ORAL at 05:55

## 2021-03-23 RX ADMIN — HYDROXYZINE HYDROCHLORIDE 10 MG: 10 TABLET, FILM COATED ORAL at 21:26

## 2021-03-23 RX ADMIN — ENOXAPARIN SODIUM 40 MG: 40 INJECTION SUBCUTANEOUS at 05:53

## 2021-03-23 RX ADMIN — LISINOPRIL 20 MG: 20 TABLET ORAL at 05:55

## 2021-03-23 RX ADMIN — LIDOCAINE HYDROCHLORIDE 15 ML: 20 SOLUTION ORAL; TOPICAL at 10:33

## 2021-03-23 ASSESSMENT — ENCOUNTER SYMPTOMS
ABDOMINAL PAIN: 0
NERVOUS/ANXIOUS: 1
DIZZINESS: 0
MYALGIAS: 1
SHORTNESS OF BREATH: 0

## 2021-03-23 NOTE — THERAPY
Missed Therapy     Patient Name: Cheyanne Lindsey  Age:  56 y.o., Sex:  female  Medical Record #: [id number]  Today's Date: 3/23/2021       03/23/21 0900   Interdisciplinary Plan of Care Collaboration   IDT Collaboration with  Nursing   Collaboration Comments Spoke with bedside nurse, pt up ad aron in room along with completing all ADLs with no physical assistance. Will complete order at this time.

## 2021-03-23 NOTE — THERAPY
Physical Therapy    Patient Name: Cheyanne Lindsey  Age:  56 y.o., Sex:  female  Medical Record #: [id number]  Today's Date: 3/23/2021    Discussed missed therapy with RN       03/23/21 1611   Interdisciplinary Plan of Care Collaboration   IDT Collaboration with  Nursing   Collaboration Comments Per RN, pt is up ad aron, having no issues with ADLS, no PT eval indicated.

## 2021-03-23 NOTE — PROGRESS NOTES
Report received at bedside from NOC RN. PT care assumed at 0700. Tele box in place and verified by monitor room. PT A&Ox4. No signs of distress noted at this time. Patient resting comfortably in bed. No complaints of pain at this time. Bed in lowest and locked position. PT educated on fall risk and verbalized understanding. Call light within reach. Bed alarm plugged in and on. All questions answered appropriately. Will continue plan of care.

## 2021-03-23 NOTE — PROGRESS NOTES
Hospital Medicine Daily Progress Note    Date of Service  3/23/2021    Chief Complaint  56 y.o. female admitted 3/22/2021 with chest pain    Hospital Course  PMH HTN who presented with multiple complaints of chest pain, mouth pain, leg pain. Patient says she had relapsed on meth use recently. UDS was positive for cannabinoids. She was found with hypertensive urgency. She was started on lisinopril along with her home metoprolol with improvement. TTE was unremarkable, stress test was negative for reversible ischemia. Mandible xray showed multiple dental caries, patient says she has seen a dentist.     Interval Problem Update  Creatinine increased. She has not been drinking as much water  Chest pain improved but she has leg pains and weakness. Pain is worse with ambulation  She also has oral sore, painful. Oral lidocaine ordered    Addendum: I consulted Dr. Lopez    Consultants/Specialty  None    Code Status  Full Code    Disposition  TBA    Review of Systems  Review of Systems   HENT:        Oral pain   Respiratory: Negative for shortness of breath.    Cardiovascular: Negative for chest pain.   Gastrointestinal: Negative for abdominal pain.   Genitourinary: Negative for dysuria and urgency.   Musculoskeletal: Positive for myalgias.   Neurological: Negative for dizziness.   Psychiatric/Behavioral: The patient is nervous/anxious.    All other systems reviewed and are negative.       Physical Exam  Temp:  [36.1 °C (96.9 °F)-36.2 °C (97.1 °F)] 36.1 °C (96.9 °F)  Pulse:  [61-75] 75  Resp:  [18] 18  BP: (111-156)/(53-79) 142/57  SpO2:  [92 %-96 %] 92 %    Physical Exam  Vitals and nursing note reviewed.   Constitutional:       General: She is not in acute distress.     Appearance: She is not toxic-appearing.   HENT:      Head: Normocephalic.      Mouth/Throat:      Mouth: Mucous membranes are moist.   Eyes:      General:         Right eye: No discharge.         Left eye: No discharge.   Cardiovascular:      Rate and  Rhythm: Normal rate and regular rhythm.   Pulmonary:      Effort: Pulmonary effort is normal. No respiratory distress.      Breath sounds: No wheezing or rales.   Abdominal:      Palpations: Abdomen is soft.      Tenderness: There is no abdominal tenderness.   Musculoskeletal:      Cervical back: Neck supple.      Right lower leg: No edema.      Left lower leg: No edema.   Skin:     General: Skin is warm and dry.      Comments: weak pedal pulses   Neurological:      Mental Status: She is alert and oriented to person, place, and time.         Fluids    Intake/Output Summary (Last 24 hours) at 3/23/2021 1127  Last data filed at 3/22/2021 1800  Gross per 24 hour   Intake --   Output 400 ml   Net -400 ml       Laboratory  Recent Labs     03/22/21  0338 03/23/21  0145   WBC 10.9* 9.7   RBC 5.03 4.81   HEMOGLOBIN 15.5 14.9   HEMATOCRIT 45.9 44.6   MCV 91.3 92.7   MCH 30.8 31.0   MCHC 33.8 33.4*   RDW 41.4 44.0   PLATELETCT 273 279   MPV 10.0 9.8     Recent Labs     03/22/21  0338 03/23/21  0145   SODIUM 137 136   POTASSIUM 3.7 4.0   CHLORIDE 99 98   CO2 21 22   GLUCOSE 245* 134*   BUN 17 21   CREATININE 0.69 1.67*   CALCIUM 9.9 10.1             Recent Labs     03/22/21  0338   TRIGLYCERIDE 126   HDL 34*   *       Imaging  US-JAVIER SINGLE LEVEL BILAT         EC-ECHOCARDIOGRAM COMPLETE W/O CONT   Final Result      NM-CARDIAC STRESS TEST   Final Result      [id number]   Final Result      Minimal periapical lucencies surrounding the lateral incisor and canine in the left mandible.      Dental caries involving the left mandibular canine.         DX-CHEST-PORTABLE (1 VIEW)   Final Result      No acute cardiopulmonary process is seen.      Atherosclerotic plaque.      US-EXTREMITY ARTERY LOWER BILAT    (Results Pending)        Assessment/Plan  * Chest pain- (present on admission)  Assessment & Plan  History of HTN, DM, HLD. Not on medications for DM or HLD  History of polysubstance and alcohol abuse. States  sober. But recently used meth 1 week ago  Utox negative for methamphetamine  Stress test neg and TTE normal  Continue aspirin and statin  CP improved      Pain in both lower extremities  Assessment & Plan  Possible neuropathy with underlying DM  Check Vit B12  Claudication history, check JAVIER    Elevated brain natriuretic peptide (BNP) level- (present on admission)  Assessment & Plan  TTE normal EF    Hypertensive urgency- (present on admission)  Assessment & Plan  Utox negative for amphetamines.  - Restart home Metop 100mg BID stat  - added lisinopril  BP improved but creat increased. Start IVF and recheck creat tomorrow    DM (diabetes mellitus) (HCC)- (present on admission)  Assessment & Plan  A1c 6.9%  Not on medications  If renal function improves, will consider metformin  DM diet education ordered  ISS    Tobacco abuse- (present on admission)  Assessment & Plan  Smoking education discussed and assistance in smoking cessation offered >5 minutes      Hypercholesterolemia- (present on admission)  Assessment & Plan  Ordered for lipitor       VTE prophylaxis: lovenox

## 2021-03-23 NOTE — CARE PLAN
Problem: Communication  Goal: The ability to communicate needs accurately and effectively will improve  Outcome: PROGRESSING AS EXPECTED  Patient educated to utilize call light. Patient and family oriented to hospital room. Patient encouraged to ask questions about plan of care. Patient effectively uses call light and is involved in POC.      Problem: Discharge Barriers/Planning  Goal: Patient's continuum of care needs will be met  Outcome: PROGRESSING AS EXPECTED  Patient discharge needs are assessed to identify potential barriers. Patient encouraged to participate in patient goals and discharge. Proper interdisciplinary teams collaborated with. Patient actively involved in care.

## 2021-03-23 NOTE — DIETARY
Nutrition Services: DM diet ed consult completed. For details, see RD note under Education tab. RD will follow PRN.

## 2021-03-24 ENCOUNTER — PHARMACY VISIT (OUTPATIENT)
Dept: PHARMACY | Facility: MEDICAL CENTER | Age: 56
End: 2021-03-24
Payer: COMMERCIAL

## 2021-03-24 VITALS
SYSTOLIC BLOOD PRESSURE: 157 MMHG | OXYGEN SATURATION: 94 % | RESPIRATION RATE: 16 BRPM | WEIGHT: 156.31 LBS | TEMPERATURE: 97.4 F | BODY MASS INDEX: 24.53 KG/M2 | HEART RATE: 62 BPM | DIASTOLIC BLOOD PRESSURE: 78 MMHG | HEIGHT: 67 IN

## 2021-03-24 LAB
ANION GAP SERPL CALC-SCNC: 12 MMOL/L (ref 7–16)
BUN SERPL-MCNC: 13 MG/DL (ref 8–22)
CALCIUM SERPL-MCNC: 9.4 MG/DL (ref 8.5–10.5)
CHLORIDE SERPL-SCNC: 99 MMOL/L (ref 96–112)
CO2 SERPL-SCNC: 25 MMOL/L (ref 20–33)
CREAT SERPL-MCNC: 0.73 MG/DL (ref 0.5–1.4)
GLUCOSE BLD-MCNC: 128 MG/DL (ref 65–99)
GLUCOSE SERPL-MCNC: 127 MG/DL (ref 65–99)
POTASSIUM SERPL-SCNC: 3.6 MMOL/L (ref 3.6–5.5)
SODIUM SERPL-SCNC: 136 MMOL/L (ref 135–145)

## 2021-03-24 PROCEDURE — A9270 NON-COVERED ITEM OR SERVICE: HCPCS | Performed by: STUDENT IN AN ORGANIZED HEALTH CARE EDUCATION/TRAINING PROGRAM

## 2021-03-24 PROCEDURE — 700102 HCHG RX REV CODE 250 W/ 637 OVERRIDE(OP): Performed by: STUDENT IN AN ORGANIZED HEALTH CARE EDUCATION/TRAINING PROGRAM

## 2021-03-24 PROCEDURE — 96372 THER/PROPH/DIAG INJ SC/IM: CPT

## 2021-03-24 PROCEDURE — 700105 HCHG RX REV CODE 258: Performed by: INTERNAL MEDICINE

## 2021-03-24 PROCEDURE — A9270 NON-COVERED ITEM OR SERVICE: HCPCS | Performed by: INTERNAL MEDICINE

## 2021-03-24 PROCEDURE — RXMED WILLOW AMBULATORY MEDICATION CHARGE: Performed by: INTERNAL MEDICINE

## 2021-03-24 PROCEDURE — 99217 PR OBSERVATION CARE DISCHARGE: CPT | Performed by: INTERNAL MEDICINE

## 2021-03-24 PROCEDURE — G0378 HOSPITAL OBSERVATION PER HR: HCPCS

## 2021-03-24 PROCEDURE — 700111 HCHG RX REV CODE 636 W/ 250 OVERRIDE (IP): Performed by: STUDENT IN AN ORGANIZED HEALTH CARE EDUCATION/TRAINING PROGRAM

## 2021-03-24 PROCEDURE — 96376 TX/PRO/DX INJ SAME DRUG ADON: CPT

## 2021-03-24 PROCEDURE — 82962 GLUCOSE BLOOD TEST: CPT

## 2021-03-24 PROCEDURE — 80048 BASIC METABOLIC PNL TOTAL CA: CPT

## 2021-03-24 PROCEDURE — 700102 HCHG RX REV CODE 250 W/ 637 OVERRIDE(OP): Performed by: INTERNAL MEDICINE

## 2021-03-24 RX ORDER — ATORVASTATIN CALCIUM 40 MG/1
40 TABLET, FILM COATED ORAL EVERY EVENING
Qty: 90 TABLET | Refills: 1 | Status: SHIPPED | OUTPATIENT
Start: 2021-03-24 | End: 2023-07-26

## 2021-03-24 RX ORDER — ATORVASTATIN CALCIUM 40 MG/1
40 TABLET, FILM COATED ORAL EVERY EVENING
Qty: 90 TABLET | Refills: 1 | Status: SHIPPED | OUTPATIENT
Start: 2021-03-24 | End: 2021-03-24

## 2021-03-24 RX ORDER — METOPROLOL TARTRATE 100 MG/1
100 TABLET ORAL 2 TIMES DAILY
Status: DISCONTINUED | OUTPATIENT
Start: 2021-03-24 | End: 2021-03-24 | Stop reason: HOSPADM

## 2021-03-24 RX ORDER — ASPIRIN 81 MG/1
81 TABLET, CHEWABLE ORAL DAILY
Qty: 30 TABLET | Refills: 1 | Status: SHIPPED | OUTPATIENT
Start: 2021-03-25

## 2021-03-24 RX ORDER — METOPROLOL TARTRATE 100 MG/1
100 TABLET ORAL 2 TIMES DAILY
Qty: 60 TABLET | Refills: 1 | Status: SHIPPED | OUTPATIENT
Start: 2021-03-24 | End: 2023-07-05 | Stop reason: SDUPTHER

## 2021-03-24 RX ADMIN — SODIUM CHLORIDE, POTASSIUM CHLORIDE, SODIUM LACTATE AND CALCIUM CHLORIDE: 600; 310; 30; 20 INJECTION, SOLUTION INTRAVENOUS at 04:02

## 2021-03-24 RX ADMIN — METOPROLOL TARTRATE 100 MG: 50 TABLET, FILM COATED ORAL at 07:32

## 2021-03-24 RX ADMIN — ACETAMINOPHEN 650 MG: 325 TABLET ORAL at 03:58

## 2021-03-24 RX ADMIN — LISINOPRIL 20 MG: 20 TABLET ORAL at 05:43

## 2021-03-24 RX ADMIN — ENALAPRILAT 1.25 MG: 1.25 INJECTION INTRAVENOUS at 03:58

## 2021-03-24 RX ADMIN — ASPIRIN 81 MG: 81 TABLET, CHEWABLE ORAL at 05:42

## 2021-03-24 RX ADMIN — ENALAPRILAT 1.25 MG: 1.25 INJECTION INTRAVENOUS at 06:05

## 2021-03-24 RX ADMIN — DULOXETINE HYDROCHLORIDE 60 MG: 60 CAPSULE, DELAYED RELEASE ORAL at 05:43

## 2021-03-24 RX ADMIN — ENOXAPARIN SODIUM 40 MG: 40 INJECTION SUBCUTANEOUS at 05:43

## 2021-03-24 NOTE — DISCHARGE PLANNING
Meds-to-Beds: Discharge prescription orders listed below delivered to patient's bedside. RN notified. Patient counseled. Patient elected to have co-payment billed to patient account.     Metoprolol too soon until 5/24/21. Patient states she has this medication available at home.      Cheyanne Lindsey   Home Medication Instructions JOANN:[id number]    Printed on:03/24/21 1028   Medication Information                      aspirin (ASA) 81 MG Chew Tab chewable tablet  Chew 1 tablet every day.             atorvastatin (LIPITOR) 40 MG Tab  Take 1 tablet by mouth every evening.             lisinopril (PRINIVIL) 20 MG Tab  Take 1 tablet by mouth every day for 30 days.             metFORMIN (GLUCOPHAGE) 500 MG Tab  Take 1 tablet by mouth 2 times a day, with meals.               Ira Parry, PharmD

## 2021-03-24 NOTE — PROGRESS NOTES
Report received at bedside from NOC RN. PT care assumed Tele box on and monitor room notified. PT A&Ox4. PT on room air. No signs of distress noted at this time. Patient resting comfortably in bed. PT denies any additional needs at this time. Bed in lowest and locked position. PT educated on fall risk and verbalized understanding. Call light within reach. All questions answered appropriately. Will continue plan of care.

## 2021-03-24 NOTE — DISCHARGE SUMMARY
Discharge Summary    CHIEF COMPLAINT ON ADMISSION  Chief Complaint   Patient presents with   • Chest Pain     occurs upon ambulation, intermittent mid sternal pain   • Headache   • Indigestion       Reason for Admission  anxiety     Admission Date  3/22/2021    CODE STATUS  Prior    HPI & HOSPITAL COURSE  57 yo woman with HTN and recent relapse of meth use who presented with multiple complaints of chest pain, mouth pain, leg pain. Her leg and month pain has been presents for months. She's been seeing her PCP for workup but her chest pain was new.     UDS was positive for cannabinoids. She was found with hypertensive urgency. She was started on lisinopril along with her home metoprolol with improvement of hypertension. TTE was unremarkable, stress test was negative for reversible ischemia. Here LDL was increased so she is started on lipitor.    Mandible xray showed multiple dental caries, patient says she is seeing a dentist.   Her vit B12 was normal. Her A1c was 6.9%, possible diabetic neuropathy causing her leg pain. I started her on metformin for diabetes and she received diet education. She did not want to trial gabapentin. 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 I discussed case with Dr. Lopez with vascular surgery, given her symptoms are chronic, she can follow up with him as outpatient, which I relayed to the patient. She was seen by PTOT and had no further needs.     Therefore, she is discharged in good and stable condition to home with close outpatient follow-up.    Discharge Date  3/24/2021    FOLLOW UP ITEMS POST DISCHARGE  Monitoring of new start metformin and DM management  HTN management with new start of lisinopril  New lipitor start for HLD  F/u with vascular surgery for PAD  Consider gabapentin for neuropathy    DISCHARGE DIAGNOSES  Principal Problem:    Chest pain POA: Yes  Active Problems:    Tobacco abuse POA: Yes    DM (diabetes mellitus)  (HCC) POA: Yes    Hypertensive urgency POA: Yes    Elevated brain natriuretic peptide (BNP) level POA: Yes    Pain in both lower extremities POA: Unknown    Hypercholesterolemia POA: Yes  Resolved Problems:    * No resolved hospital problems. *      FOLLOW UP  Adair Lopez M.D.  [address]  [facility] [id number] [phone]    Schedule an appointment as soon as possible for a visit      RADHA Oswald  [address]  [facility] [address]  [phone]    Schedule an appointment as soon as possible for a visit in 1 week        MEDICATIONS ON DISCHARGE     Medication List      START taking these medications      Instructions   Aspirin Low Dose 81 MG Chew chewable tablet  Start taking on: March 25, 2021  Generic drug: aspirin   Chew 1 tablet every day.  Dose: 81 mg     atorvastatin 40 MG Tabs  Commonly known as: LIPITOR   Take 1 tablet by mouth every evening.  Dose: 40 mg     lisinopril 20 MG Tabs  Commonly known as: PRINIVIL   Take 1 tablet by mouth every day for 30 days.  Dose: 20 mg     metFORMIN 500 MG Tabs  Commonly known as: GLUCOPHAGE   Take 1 tablet by mouth 2 times a day, with meals.  Dose: 500 mg     nicotine 14 MG/24HR Pt24  Commonly known as: NICODERM   Place 1 Patch on the skin every 24 hours for 7 days.  Dose: 1 Patch        CHANGE how you take these medications      Instructions   metoprolol tartrate 100 MG Tabs  What changed:   · medication strength  · how much to take  · when to take this  Commonly known as: LOPRESSOR   Take 1 tablet by mouth 2 times a day.  Dose: 100 mg        CONTINUE taking these medications      Instructions   DULoxetine 60 MG Cpep delayed-release capsule  Commonly known as: CYMBALTA   Doctor's comments: **Patient requests 90 days supply**  TAKE 1 CAPSULE BY MOUTH EVERY DAY        STOP taking these medications    acetaminophen 500 MG Tabs  Commonly known as: TYLENOL            Allergies  No Known Allergies    DIET  No orders of the defined types were  placed in this encounter.      ACTIVITY  As tolerated.  Weight bearing as tolerated    CONSULTATIONS  None    PROCEDURES  US-EXTREMITY ARTERY LOWER BILAT   Final Result      US-JAVIER SINGLE LEVEL BILAT   Final Result      EC-ECHOCARDIOGRAM COMPLETE W/O CONT   Final Result      NM-CARDIAC STRESS TEST   Final Result      [id number]   Final Result      Minimal periapical lucencies surrounding the lateral incisor and canine in the left mandible.      Dental caries involving the left mandibular canine.         DX-CHEST-PORTABLE (1 VIEW)   Final Result      No acute cardiopulmonary process is seen.      Atherosclerotic plaque.            LABORATORY  Lab Results   Component Value Date    SODIUM 136 03/24/2021    POTASSIUM 3.6 03/24/2021    CHLORIDE 99 03/24/2021    CO2 25 03/24/2021    GLUCOSE 127 (H) 03/24/2021    BUN 13 03/24/2021    CREATININE 0.73 03/24/2021        Lab Results   Component Value Date    WBC 9.7 03/23/2021    HEMOGLOBIN 14.9 03/23/2021    HEMATOCRIT 44.6 03/23/2021    PLATELETCT 279 03/23/2021        Total time of the discharge process exceeds 32 minutes.

## 2021-03-24 NOTE — PROGRESS NOTES
Patient discharged to Discharge Lounge via wheelchair with RN. Discharge paperwork was discussed with the patient. LDAs were removed. Tele monitor disconnected. PT prescriptions were dropped off by Meds to Bed. Patient escorted out in wheelchair.

## 2021-03-24 NOTE — DISCHARGE INSTRUCTIONS
Discharge Instructions per Petros Arceo M.D.    DIAGNOSIS:   1. Hypertension - Continue taking Metoprolol along with Lisinopril  2. High cholesterol - You are started on Lipitor  3. Diabetes - You are started on Metformin  Please see your primary care doctor in 1 week about these new medications.  4. Arterial disease in your lower legs - Please make an appointment to see Dr. Adair Lopez with vascular surgery      .Discharge Instructions    Discharged to home by car with relative. Discharged via wheelchair, hospital escort: Yes.  Special equipment needed: Not Applicable    Be sure to schedule a follow-up appointment with your primary care doctor or any specialists as instructed.     Discharge Plan:   Diet Plan: Discussed  Activity Level: Discussed  Confirmed Symptoms Management: Discussed  Medication Reconciliation Updated: Yes  Influenza Vaccine Indication: Patient Refuses    I understand that a diet low in cholesterol, fat, and sodium is recommended for good health. Unless I have been given specific instructions below for another diet, I accept this instruction as my diet prescription.   Other diet: Diabetic Diet    Special Instructions: None     · Is patient discharged on Warfarin / Coumadin?   No     Depression / Suicide Risk    As you are discharged from this [facility] facility, it is important to learn how to keep safe from harming yourself.    Recognize the warning signs:  · Abrupt changes in personality, positive or negative- including increase in energy   · Giving away possessions  · Change in eating patterns- significant weight changes-  positive or negative  · Change in sleeping patterns- unable to sleep or sleeping all the time   · Unwillingness or inability to communicate  · Depression  · Unusual sadness, discouragement and loneliness  · Talk of wanting to die  · Neglect of personal appearance   · Rebelliousness- reckless behavior  · Withdrawal from people/activities they love  · Confusion-  inability to concentrate     If you or a loved one observes any of these behaviors or has concerns about self-harm, here's what you can do:  · Talk about it- your feelings and reasons for harming yourself  · Remove any means that you might use to hurt yourself (examples: pills, rope, extension cords, firearm)  · Get professional help from the community (Mental Health, Substance Abuse, psychological counseling)  · Do not be alone:Call your Safe Contact- someone whom you trust who will be there for you.  · Call your local CRISIS HOTLINE 611-4156 or 875-058-2926  · Call your local Children's Mobile Crisis Response Team [place] (719) 135-9113 or www.[employer]  · Call the toll free National Suicide Prevention Hotlines   · National Suicide Prevention Lifeline 895-772-DKDX (6685)  · [place] Line Network 800-SUICIDE (814-1868)      Hypertension, Adult  Hypertension is another name for high blood pressure. High blood pressure forces your heart to work harder to pump blood. This can cause problems over time.  There are two numbers in a blood pressure reading. There is a top number (systolic) over a bottom number (diastolic). It is best to have a blood pressure that is below 120/80. Healthy choices can help lower your blood pressure, or you may need medicine to help lower it.  What are the causes?  The cause of this condition is not known. Some conditions may be related to high blood pressure.  What increases the risk?  · Smoking.  · Having type 2 diabetes mellitus, high cholesterol, or both.  · Not getting enough exercise or physical activity.  · Being overweight.  · Having too much fat, sugar, calories, or salt (sodium) in your diet.  · Drinking too much alcohol.  · Having long-term (chronic) kidney disease.  · Having a family history of high blood pressure.  · Age. Risk increases with age.  · Race. You may be at higher risk if you are .  · Gender. Men are at higher risk than women before  age 45. After age 65, women are at higher risk than men.  · Having obstructive sleep apnea.  · Stress.  What are the signs or symptoms?  · High blood pressure may not cause symptoms. Very high blood pressure (hypertensive crisis) may cause:  ? Headache.  ? Feelings of worry or nervousness (anxiety).  ? Shortness of breath.  ? Nosebleed.  ? A feeling of being sick to your stomach (nausea).  ? Throwing up (vomiting).  ? Changes in how you see.  ? Very bad chest pain.  ? Seizures.  How is this treated?  · This condition is treated by making healthy lifestyle changes, such as:  ? Eating healthy foods.  ? Exercising more.  ? Drinking less alcohol.  · Your health care provider may prescribe medicine if lifestyle changes are not enough to get your blood pressure under control, and if:  ? Your top number is above 130.  ? Your bottom number is above 80.  · Your personal target blood pressure may vary.  Follow these instructions at home:  Eating and drinking    · If told, follow the DASH eating plan. To follow this plan:  ? Fill one half of your plate at each meal with fruits and vegetables.  ? Fill one fourth of your plate at each meal with whole grains. Whole grains include whole-wheat pasta, brown rice, and whole-grain bread.  ? Eat or drink low-fat dairy products, such as skim milk or low-fat yogurt.  ? Fill one fourth of your plate at each meal with low-fat (lean) proteins. Low-fat proteins include fish, chicken without skin, eggs, beans, and tofu.  ? Avoid fatty meat, cured and processed meat, or chicken with skin.  ? Avoid pre-made or processed food.  · Eat less than 1,500 mg of salt each day.  · Do not drink alcohol if:  ? Your doctor tells you not to drink.  ? You are pregnant, may be pregnant, or are planning to become pregnant.  · If you drink alcohol:  ? Limit how much you use to:  § 0-1 drink a day for women.  § 0-2 drinks a day for men.  ? Be aware of how much alcohol is in your drink. In the [place] one drink  equals one 12 oz bottle of beer (355 mL), one 5 oz glass of wine (148 mL), or one 1½ oz glass of hard liquor (44 mL).  Lifestyle    · Work with your doctor to stay at a healthy weight or to lose weight. Ask your doctor what the best weight is for you.  · Get at least 30 minutes of exercise most days of the week. This may include walking, swimming, or biking.  · Get at least 30 minutes of exercise that strengthens your muscles (resistance exercise) at least 3 days a week. This may include lifting weights or doing Pilates.  · Do not use any products that contain nicotine or tobacco, such as cigarettes, e-cigarettes, and chewing tobacco. If you need help quitting, ask your doctor.  · Check your blood pressure at home as told by your doctor.  · Keep all follow-up visits as told by your doctor. This is important.  Medicines  · Take over-the-counter and prescription medicines only as told by your doctor. Follow directions carefully.  · Do not skip doses of blood pressure medicine. The medicine does not work as well if you skip doses. Skipping doses also puts you at risk for problems.  · Ask your doctor about side effects or reactions to medicines that you should watch for.  Contact a doctor if you:  · Think you are having a reaction to the medicine you are taking.  · Have headaches that keep coming back (recurring).  · Feel dizzy.  · Have swelling in your ankles.  · Have trouble with your vision.  Get help right away if you:  · Get a very bad headache.  · Start to feel mixed up (confused).  · Feel weak or numb.  · Feel faint.  · Have very bad pain in your:  ? Chest.  ? Belly (abdomen).  · Throw up more than once.  · Have trouble breathing.  Summary  · Hypertension is another name for high blood pressure.  · High blood pressure forces your heart to work harder to pump blood.  · For most people, a normal blood pressure is less than 120/80.  · Making healthy choices can help lower blood pressure. If your blood pressure does  "not get lower with healthy choices, you may need to take medicine.  This information is not intended to replace advice given to you by your health care provider. Make sure you discuss any questions you have with your health care provider.  Document Released: 06/05/2009 Document Revised: 08/28/2019 Document Reviewed: 08/28/2019  Elsevier Patient Education © 2020 [employer]      Diets for Diabetes, Food Labeling  Look at food labels to help you decide how much of a product you can eat. You will want to check the amount of total carbohydrate in a serving to see how the food fits into your meal plan. In the list of ingredients, the ingredient present in the largest amount by weight must be listed first, followed by the other ingredients in descending order.  STANDARD OF IDENTITY  Most products have a list of ingredients. However, foods that the Food and Drug Administration (FDA) has given a standard of identity do not need a list of ingredients. A standard of identity means that a food must contain certain ingredients if it is called a particular name. Examples are mayonnaise, peanut butter, ketchup, jelly, and cheese.  LABELING TERMS  There are many terms found on food labels. Some of these terms have specific definitions. Some terms are regulated by the FDA, and the FDA has clearly specified how they can be used. Others are not regulated or well-defined and can be misleading and confusing.  SPECIFICALLY DEFINED TERMS  Nutritive Sweetener.  · A sweetener that contains calories,such as table sugar or honey.  Nonnutritive Sweetener.  · A sweetener with few or no calories,such as saccharin, aspartame, sucralose, and cyclamate.  LABELING TERMS REGULATED BY THE FDA  Free.  · The product contains only a tiny or small amount of fat, cholesterol, sodium, sugar, or calories. For example, a \"fat-free\" product will contain less than 0.5 g of fat per serving.  Low.  · A food described as \"low\" in fat, saturated fat, " "cholesterol, sodium, or calories could be eaten fairly often without exceeding dietary guidelines. For example, \"low in fat\" means no more than 3 g of fat per serving.  Lean.  · \"Lean\" and \"extra lean\" are U.S. Department of Agriculture (USDA) terms for use on meat and poultry products. \"Lean\" means the product contains less than 10 g of fat, 4 g of saturated fat, and 95 mg of cholesterol per serving. \"Lean\" is not as low in fat as a product labeled \"low.\"  Extra Lean.  · \"Extra lean\" means the product contains less than 5 g of fat, 2 g of saturated fat, and 95 mg of cholesterol per serving. While \"extra lean\" has less fat than \"lean,\" it is still higher in fat than a product labeled \"low.\"  Reduced, Less, Fewer.  · A diet product that contains 25% less of a nutrient or calories than the regular version. For example, hot dogs might be labeled \"25% less fat than our regular hot dogs.\"  Light/Lite.  · A diet product that contains  fewer calories or ½ the fat of the original. For example, \"light in sodium\" means a product with ½ the usual sodium.  More.  · One serving contains at least 10% more of the daily value of a vitamin, mineral, or fiber than usual.  Good Source Of.  · One serving contains 10% to 19% of the daily value for a particular vitamin, mineral, or fiber.  Excellent Source Of.  · One serving contains 20% or more of the daily value for a particular nutrient. Other terms used might be \"high in\" or \"rich in.\"  Enriched or Fortified.  · The product contains added vitamins, minerals, or protein. Nutrition labeling must be used on enriched or fortified foods.  Imitation.  · The product has been altered so that it is lower in protein, vitamins, or minerals than the usual food,such as imitation peanut butter.  Total Fat.  · The number listed is the total of all fat found in a serving of the product. Under total fat, food labels must list saturated fat and trans fat, which are associated with raising bad " "cholesterol and an increased risk of heart blood vessel disease.  Saturated Fat.  · Mainly fats from animal-based sources. Some examples are red meat, cheese, cream, whole milk, and coconut oil.  Trans Fat.  · Found in some fried snack foods, packaged foods, and fried restaurant foods. It is recommended you eat as close to 0 g of trans fat as possible, since it raises bad cholesterol and lowers good cholesterol.  Polyunsaturated and Monounsaturated Fats.  · More healthful fats. These fats are from plant sources.  Total Carbohydrate.  · The number of carbohydrate grams in a serving of the product. Under total carbohydrate are listed the other carbohydrate sources, such as dietary fiber and sugars.  Dietary Fiber.  · A carbohydrate from plant sources.  Sugars.  · Sugars listed on the label contain all naturally occurring sugars as well as added sugars.  LABELING TERMS NOT REGULATED BY THE FDA  Sugarless.  · Table sugar (sucrose) has not been added. However, the  may use another form of sugar in place of sucrose to sol the product. For example, sugar alcohols are used to sol foods. Sugar alcohols are a form of sugar but are not table sugar. If a product contains sugar alcohols in place of sucrose, it can still be labeled \"sugarless.\"  Low Salt, Salt-Free, Unsalted, No Salt, No Salt Added, Without Added Salt.  · Food that is usually processed with salt has been made without salt. However, the food may contain sodium-containing additives, such as preservatives, leavening agents, or flavorings.  Natural.  · This term has no legal meaning.  Organic.  · Foods that are certified as organic have been inspected and approved by the USDA to ensure they are produced without pesticides, fertilizers containing synthetic ingredients, bioengineering, or ionizing radiation.  Document Released: 12/20/2004 Document Revised: 03/11/2013 Document Reviewed: 07/08/2010  [employer]® Patient Information ©2014 [employer].

## 2023-07-05 ENCOUNTER — PATIENT MESSAGE (OUTPATIENT)
Dept: MEDICAL GROUP | Facility: CLINIC | Age: 58
End: 2023-07-05

## 2023-07-05 ENCOUNTER — OFFICE VISIT (OUTPATIENT)
Dept: MEDICAL GROUP | Facility: CLINIC | Age: 58
End: 2023-07-05
Payer: MEDICARE

## 2023-07-05 VITALS
BODY MASS INDEX: 18.16 KG/M2 | WEIGHT: 119.8 LBS | TEMPERATURE: 97.8 F | HEART RATE: 73 BPM | HEIGHT: 68 IN | SYSTOLIC BLOOD PRESSURE: 176 MMHG | DIASTOLIC BLOOD PRESSURE: 62 MMHG | OXYGEN SATURATION: 95 %

## 2023-07-05 DIAGNOSIS — E11.9 TYPE 2 DIABETES MELLITUS WITHOUT COMPLICATION, WITHOUT LONG-TERM CURRENT USE OF INSULIN (HCC): Primary | ICD-10-CM

## 2023-07-05 DIAGNOSIS — M25.50 POLYARTHRALGIA: ICD-10-CM

## 2023-07-05 DIAGNOSIS — M79.604 PAIN IN BOTH LOWER EXTREMITIES: ICD-10-CM

## 2023-07-05 DIAGNOSIS — G89.29 CHRONIC MIDLINE LOW BACK PAIN WITH BILATERAL SCIATICA: ICD-10-CM

## 2023-07-05 DIAGNOSIS — M54.41 CHRONIC MIDLINE LOW BACK PAIN WITH BILATERAL SCIATICA: ICD-10-CM

## 2023-07-05 DIAGNOSIS — Z72.0 TOBACCO ABUSE: ICD-10-CM

## 2023-07-05 DIAGNOSIS — I73.9 PAD (PERIPHERAL ARTERY DISEASE) (HCC): ICD-10-CM

## 2023-07-05 DIAGNOSIS — M79.605 PAIN IN BOTH LOWER EXTREMITIES: ICD-10-CM

## 2023-07-05 DIAGNOSIS — R87.629 ABNORMAL VAGINAL PAP SMEAR: ICD-10-CM

## 2023-07-05 DIAGNOSIS — Z00.00 HEALTHCARE MAINTENANCE: ICD-10-CM

## 2023-07-05 DIAGNOSIS — M54.42 CHRONIC MIDLINE LOW BACK PAIN WITH BILATERAL SCIATICA: ICD-10-CM

## 2023-07-05 PROBLEM — R79.89 ELEVATED BRAIN NATRIURETIC PEPTIDE (BNP) LEVEL: Status: RESOLVED | Noted: 2021-03-22 | Resolved: 2023-07-05

## 2023-07-05 PROBLEM — I16.0 HYPERTENSIVE URGENCY: Status: RESOLVED | Noted: 2021-03-22 | Resolved: 2023-07-05

## 2023-07-05 PROCEDURE — 99204 OFFICE O/P NEW MOD 45 MIN: CPT | Mod: GC

## 2023-07-05 PROCEDURE — 3078F DIAST BP <80 MM HG: CPT | Mod: GC

## 2023-07-05 PROCEDURE — 3077F SYST BP >= 140 MM HG: CPT | Mod: GC

## 2023-07-05 RX ORDER — EZETIMIBE 10 MG/1
10 TABLET ORAL
COMMUNITY
Start: 2023-04-18 | End: 2023-07-05 | Stop reason: SDUPTHER

## 2023-07-05 RX ORDER — EZETIMIBE 10 MG/1
10 TABLET ORAL
Qty: 90 TABLET | Refills: 3 | Status: SHIPPED | OUTPATIENT
Start: 2023-07-05

## 2023-07-05 RX ORDER — AMLODIPINE BESYLATE 5 MG/1
7.5 TABLET ORAL NIGHTLY
Qty: 30 TABLET | Refills: 3 | Status: SHIPPED | OUTPATIENT
Start: 2023-07-05

## 2023-07-05 RX ORDER — METOPROLOL TARTRATE 100 MG/1
100 TABLET ORAL 2 TIMES DAILY
Qty: 180 TABLET | Refills: 1 | Status: SHIPPED | OUTPATIENT
Start: 2023-07-05 | End: 2023-07-26

## 2023-07-05 RX ORDER — NICOTINE 21 MG/24HR
1 PATCH, TRANSDERMAL 24 HOURS TRANSDERMAL EVERY 24 HOURS
Qty: 30 PATCH | Refills: 3 | Status: SHIPPED | OUTPATIENT
Start: 2023-07-05

## 2023-07-05 RX ORDER — AMLODIPINE BESYLATE 5 MG/1
7.5 TABLET ORAL NIGHTLY
COMMUNITY
Start: 2023-05-16 | End: 2023-07-05 | Stop reason: SDUPTHER

## 2023-07-05 SDOH — ECONOMIC STABILITY: HOUSING INSECURITY
IN THE LAST 12 MONTHS, WAS THERE A TIME WHEN YOU DID NOT HAVE A STEADY PLACE TO SLEEP OR SLEPT IN A SHELTER (INCLUDING NOW)?: NO

## 2023-07-05 SDOH — ECONOMIC STABILITY: INCOME INSECURITY: IN THE LAST 12 MONTHS, WAS THERE A TIME WHEN YOU WERE NOT ABLE TO PAY THE MORTGAGE OR RENT ON TIME?: YES

## 2023-07-05 SDOH — ECONOMIC STABILITY: FOOD INSECURITY: WITHIN THE PAST 12 MONTHS, YOU WORRIED THAT YOUR FOOD WOULD RUN OUT BEFORE YOU GOT MONEY TO BUY MORE.: NEVER TRUE

## 2023-07-05 SDOH — ECONOMIC STABILITY: TRANSPORTATION INSECURITY
IN THE PAST 12 MONTHS, HAS LACK OF TRANSPORTATION KEPT YOU FROM MEETINGS, WORK, OR FROM GETTING THINGS NEEDED FOR DAILY LIVING?: NO

## 2023-07-05 SDOH — ECONOMIC STABILITY: HOUSING INSECURITY: IN THE LAST 12 MONTHS, HOW MANY PLACES HAVE YOU LIVED?: 1

## 2023-07-05 SDOH — ECONOMIC STABILITY: FOOD INSECURITY: WITHIN THE PAST 12 MONTHS, THE FOOD YOU BOUGHT JUST DIDN'T LAST AND YOU DIDN'T HAVE MONEY TO GET MORE.: NEVER TRUE

## 2023-07-05 SDOH — ECONOMIC STABILITY: TRANSPORTATION INSECURITY
IN THE PAST 12 MONTHS, HAS THE LACK OF TRANSPORTATION KEPT YOU FROM MEDICAL APPOINTMENTS OR FROM GETTING MEDICATIONS?: YES

## 2023-07-05 ASSESSMENT — SOCIAL DETERMINANTS OF HEALTH (SDOH): HOW HARD IS IT FOR YOU TO PAY FOR THE VERY BASICS LIKE FOOD, HOUSING, MEDICAL CARE, AND HEATING?: SOMEWHAT HARD

## 2023-07-05 ASSESSMENT — PATIENT HEALTH QUESTIONNAIRE - PHQ9: CLINICAL INTERPRETATION OF PHQ2 SCORE: 0

## 2023-07-05 ASSESSMENT — FIBROSIS 4 INDEX: FIB4 SCORE: 1.15

## 2023-07-05 NOTE — PROGRESS NOTES
This note is formatted in an APSO format, for additional subjective and objective evaluation please scroll to the bottom of the note.    CC:  Chief Complaint   Patient presents with    Establish Care       Assessment/Plan:  Problem List Items Addressed This Visit       Tobacco abuse     45 pack-year history, Quit 2013, started again 2018. Interested in smoking cessation.   - Order Lung cancer screen CT  - Referral to Lung Cancer Screening Program  - Ordered nicotine patches         Relevant Medications    nicotine (NICODERM) 21 MG/24HR PATCH 24 HR    Other Relevant Orders    CT-LUNG CANCER-SCREENING    REFERRAL TO LUNG CANCER SCREENING PROGRAM    Abnormal vaginal Pap smear     - Return for Pap smear in 3 weeks         Chronic low back pain     Pain shooting from low back down to L and R legs, worse on L leg         Polyarthralgia     Seen by Rheumatology in the past, currently complaining of hand and knee joint pains in the mornings. States she prefers not to take ibuprofen due to kidney problems in the past.  - Start with diclofenac topical OTC         DM (diabetes mellitus) ([facility]) - Primary     -Check diabetic screenings: A1c, CMP, Lipid panel, microalbumin, retinal exam, monofilament exam  -Consider restarting metformin if A1c elevated         Relevant Orders    Comp Metabolic Panel    Hemoglobin A1c    Lipid Profile    Microalbumin Creat Ratio Urine - Lab Collect    Referral to Ophthalmology    Diabetic Monofilament Lower Extremity Exam (Completed)    Pain in both lower extremities     History of claudication, worsens with even mild walking. Told in the past she had stenosis in both lower extremities, but did not follow up  - Refer to vascular surgery          Other Visit Diagnoses       PAD (peripheral artery disease) ([facility])        Relevant Medications    metoprolol tartrate (LOPRESSOR) 100 MG Tab    ezetimibe (ZETIA) 10 MG Tab    amLODIPine (NORVASC) 5 MG Tab    Other Relevant Orders    Referral to Vascular  "Surgery    Healthcare maintenance        Relevant Orders    [id number]           Orders Placed This Encounter    Diabetic Monofilament Lower Extremity Exam    CT-LUNG CANCER-SCREENING    [id number]    Comp Metabolic Panel    Hemoglobin A1c    Lipid Profile    Microalbumin Creat Ratio Urine - Lab Collect    Referral to Vascular Surgery    Referral to Ophthalmology    REFERRAL TO LUNG CANCER SCREENING PROGRAM    DISCONTD: ezetimibe (ZETIA) 10 MG Tab    DISCONTD: amLODIPine (NORVASC) 5 MG Tab    nicotine (NICODERM) 21 MG/24HR PATCH 24 HR    metoprolol tartrate (LOPRESSOR) 100 MG Tab    ezetimibe (ZETIA) 10 MG Tab    amLODIPine (NORVASC) 5 MG Tab       Return in about 3 weeks (around 7/26/2023) for Pap smear.      HISTORY OF PRESENT ILLNESS: Patient is a 58 y.o. female established patient who presents today to establish care:    In 2021 she was diagnosed with severe dental infections that she believes caused a lot of other medical problems to be exacerbated.  She states that she went from 180 pounds to 106 pounds over the span of a few months.  She had been prediabetic for years, but states that over this time her blood sugars \"went out of control.\"  She also had problems with her liver and kidneys during that time and had 2 inpatient mental health hospitalizations.  She states her doctors believed she was bipolar.  Patient's mother was also bipolar, and the patient states she agrees she was behaving similar to her mother during that period.  After removal of her teeth, the patient says that most of these problems resolved, including her blood sugars, liver and kidney problems, and her mental issues.  She now feels 75% better she states, but still feels she was in a \"fog for a while.\"  Her last PCP, CHIO Arcos fired the patient for missing too many appointments.    HTN: She has a history of hypertension on amlodipine and metoprolol.    DLD: She has a history of dyslipidemia, initially on " "a statin but changed to ezetimibe due to liver problems at the time of her illness in 2021.    PAD: During her illness she believes she received vascular work-up showing stenosis of 70% in the left leg and 50% in the right leg.  Was told to follow-up with vascular surgery but did not.  She feels pain and claudication with walking, left worse than right.    Type 2 diabetes: Diagnosed during her illness in 2021, started on metformin at that time.  Once blood sugars improved, patient stopped taking metformin.  She says now \"I only check my sugars occasionally at home.\"    Polyarthralgias: Patient states she has chronic hand and knee pains, seen by rheumatology in the past but no definitive diagnosis.  She says she does not like to take ibuprofen because of kidney problems she had in 2021, but has not tried diclofenac gel.    Low back pain: She endorses chronic back pain for decades. Pain from low back radiating down bilateral lower legs, worse at night    Fibromyalgia: On disability for reflex and pathetic dystrophy/fibromyalgia diagnosed in the 1990s.    Tobacco use: Started at age 14.  Quit in 2013 with help of hypnosis, started again in 2018.  Interested in quitting again, would like to start with nicotine patches.  Has not had low-dose lung CT screening before.    Preventative care: Believes her last colonoscopy was during her illness in 2021.  Last mammogram around 2015.  Last Pap smear around 2015.      Problem   Pain in Both Lower Extremities   DM (Diabetes Mellitus) ([facility])    Borderline diabetic for years. With dental problems in 2021, progressed to full diabetes and started on metformin. Stopped taking metformin after dental problems resolved and did not follow up.     Tobacco Abuse   Abnormal Vaginal Pap Smear    Dysplasia found on Pap smear around 2013, told to follow up every 6 months. No Pap smears since about 2015.     Chronic Low Back Pain   Polyarthralgia   Hypertensive Urgency (Resolved)   Elevated " Brain Natriuretic Peptide (Bnp) Level (Resolved)   Muscle Cramp (Resolved)   Chest Pain (Resolved)   Impaired Fasting Glucose (Resolved)       1. Type 2 diabetes mellitus without complication, without long-term current use of insulin ([facility])  Comp Metabolic Panel    Hemoglobin A1c    Lipid Profile    Microalbumin Creat Ratio Urine - Lab Collect    Referral to Ophthalmology    Diabetic Monofilament Lower Extremity Exam      2. Chronic midline low back pain with bilateral sciatica        3. Tobacco abuse  CT-LUNG CANCER-SCREENING    REFERRAL TO LUNG CANCER SCREENING PROGRAM      4. PAD (peripheral artery disease) ([facility])  Referral to Vascular Surgery      5. Healthcare maintenance  [id number]      6. Abnormal vaginal Pap smear        7. Polyarthralgia        8. Pain in both lower extremities            Patient Active Problem List    Diagnosis Date Noted    Pain in both lower extremities 03/23/2021    DM (diabetes mellitus) ([facility]) 03/22/2021    Fibromyalgia 05/19/2016    Tobacco abuse 05/19/2016    Abnormal vaginal Pap smear 05/19/2016    Hypercholesterolemia 05/19/2016    Chronic low back pain 05/19/2016    Polyarthralgia 05/19/2016      Allergies:Patient has no known allergies.    Current Outpatient Medications   Medication Sig Dispense Refill    nicotine (NICODERM) 21 MG/24HR PATCH 24 HR Place 1 Patch on the skin every 24 hours. 30 Patch 3    metoprolol tartrate (LOPRESSOR) 100 MG Tab Take 1 Tablet by mouth 2 times a day. 180 Tablet 1    ezetimibe (ZETIA) 10 MG Tab Take 1 Tablet by mouth at bedtime. 90 Tablet 3    amLODIPine (NORVASC) 5 MG Tab Take 1.5 Tablets by mouth every evening. 30 Tablet 3    aspirin (ASA) 81 MG Chew Tab chewable tablet Chew 1 tablet every day. 30 tablet 1    metFORMIN (GLUCOPHAGE) 500 MG Tab Take 1 tablet by mouth 2 times a day, with meals. (Patient not taking: Reported on 7/5/2023) 180 tablet 0    atorvastatin (LIPITOR) 40 MG Tab Take 1 tablet by mouth every evening. (Patient not  "taking: Reported on 7/5/2023) 90 tablet 1    duloxetine (CYMBALTA) 60 MG Cap DR Particles delayed-release capsule TAKE 1 CAPSULE BY MOUTH EVERY DAY (Patient not taking: No sig reported) 90 Cap 0     No current facility-administered medications for this visit.       Social History     Tobacco Use    Smoking status: Every Day     Packs/day: 1.00     Years: 45.00     Pack years: 45.00     Types: Cigarettes     Start date: 7/1/1980    Smokeless tobacco: Never   Vaping Use    Vaping Use: Never used   Substance Use Topics    Alcohol use: Not Currently     Comment: QUIT IN 1989    Drug use: Not Currently     Types: Marijuana     Comment: Occasional TCH past 3-4 months, quit July 2023     Social History     Social History Narrative    .   passed away by suicide in the 1990s.  She has 3 children, 2 girls and 1 boy.  Her son was recently hospitalized for trauma after a motor vehicle accident.       Family History   Problem Relation Age of Onset    Diabetes Mother     Bipolar disorder Mother     No Known Problems Father     Thyroid Sister     No Known Problems Brother     Diabetes Maternal Aunt     Breast Cancer Maternal Grandmother     Heart Failure Maternal Grandmother     Asthma Maternal Grandmother     Diabetes Maternal Grandfather     No Known Problems Daughter     No Known Problems Son     Diabetes Other     Thyroid Other     Heart Attack Other        Exam:    BP (!) 176/62 (BP Location: Right arm, Patient Position: Sitting, BP Cuff Size: Adult)   Pulse 73   Temp 36.6 °C (97.8 °F) (Temporal)   Ht 1.727 m (5' 8\")   Wt 54.3 kg (119 lb 12.8 oz)   LMP 10/04/2011   SpO2 95%   BMI 18.22 kg/m²  Body mass index is 18.22 kg/m².    Gen: Alert and oriented, No apparent distress. Well developed  HEENT: NCAT, MMM  Neck: Supple, FROM  Chest: No deformities, Equal chest expansion  Lungs: Normal effort, breath sounds diminished bilaterally, no wheezes, rhonchi, or rales  CV: Occasional PVCs auscultated and palpated " during exam.  Peripheral pulses diminished but palpable.  Posterior tibial pulses intact, left pedal pulse weaker than right.  Abd: Non-distended  Ext: No clubbing, cyanosis, edema.  Varicose veins present  Skin: Warm/dry, without rashes  Neuro: A/O x 4, CN 2-12 Grossly intact, Motor/sensory grossly intact  Psych: Normal behavior, normal affect      Francisco Anders MD  PGY-1  [facility] [employer] [employer] [facility]

## 2023-07-05 NOTE — ASSESSMENT & PLAN NOTE
45 pack-year history, Quit 2013, started again 2018. Interested in smoking cessation.   - Order Lung cancer screen CT  - Referral to Lung Cancer Screening Program  - Ordered nicotine patches

## 2023-07-06 NOTE — ASSESSMENT & PLAN NOTE
Seen by Rheumatology in the past, currently complaining of hand and knee joint pains in the mornings. States she prefers not to take ibuprofen due to kidney problems in the past.  - Start with diclofenac topical OTC

## 2023-07-06 NOTE — ASSESSMENT & PLAN NOTE
-Check diabetic screenings: A1c, CMP, Lipid panel, microalbumin, retinal exam, monofilament exam  -Consider restarting metformin if A1c elevated

## 2023-07-06 NOTE — ASSESSMENT & PLAN NOTE
History of claudication, worsens with even mild walking. Told in the past she had stenosis in both lower extremities, but did not follow up  - Refer to vascular surgery

## 2023-07-26 ENCOUNTER — HOSPITAL ENCOUNTER (OUTPATIENT)
Facility: MEDICAL CENTER | Age: 58
End: 2023-07-26
Payer: MEDICARE

## 2023-07-26 ENCOUNTER — OFFICE VISIT (OUTPATIENT)
Dept: MEDICAL GROUP | Facility: CLINIC | Age: 58
End: 2023-07-26
Payer: MEDICARE

## 2023-07-26 VITALS
OXYGEN SATURATION: 93 % | HEIGHT: 68 IN | HEART RATE: 50 BPM | BODY MASS INDEX: 18.94 KG/M2 | RESPIRATION RATE: 16 BRPM | WEIGHT: 125 LBS | DIASTOLIC BLOOD PRESSURE: 82 MMHG | SYSTOLIC BLOOD PRESSURE: 159 MMHG | TEMPERATURE: 97.8 F

## 2023-07-26 DIAGNOSIS — G89.29 CHRONIC MIDLINE LOW BACK PAIN WITH BILATERAL SCIATICA: ICD-10-CM

## 2023-07-26 DIAGNOSIS — E11.9 TYPE 2 DIABETES MELLITUS WITHOUT COMPLICATION, WITHOUT LONG-TERM CURRENT USE OF INSULIN (HCC): ICD-10-CM

## 2023-07-26 DIAGNOSIS — M54.42 CHRONIC MIDLINE LOW BACK PAIN WITH BILATERAL SCIATICA: ICD-10-CM

## 2023-07-26 DIAGNOSIS — R87.629 ABNORMAL VAGINAL PAP SMEAR: ICD-10-CM

## 2023-07-26 DIAGNOSIS — M54.41 CHRONIC MIDLINE LOW BACK PAIN WITH BILATERAL SCIATICA: ICD-10-CM

## 2023-07-26 PROCEDURE — 3079F DIAST BP 80-89 MM HG: CPT

## 2023-07-26 PROCEDURE — 99213 OFFICE O/P EST LOW 20 MIN: CPT | Mod: GC

## 2023-07-26 PROCEDURE — 3077F SYST BP >= 140 MM HG: CPT

## 2023-07-26 RX ORDER — METOPROLOL TARTRATE 50 MG/1
50 TABLET, FILM COATED ORAL 2 TIMES DAILY
COMMUNITY
Start: 2023-05-16

## 2023-07-26 ASSESSMENT — FIBROSIS 4 INDEX: FIB4 SCORE: 1.15

## 2023-07-26 NOTE — ASSESSMENT & PLAN NOTE
-Will refer to physical therapy  -Vascular referral at last visit due to history of PAD, reprinted for patient  -L-spine x-ray 3 views  -Bilateral hip x-rays

## 2023-07-26 NOTE — ASSESSMENT & PLAN NOTE
-Did not obtain labs due to son breaking his legs this past month.  -Will follow up on lab results at next visit, reprinted lab orders for patient.

## 2023-07-26 NOTE — PROGRESS NOTES
This note is formatted in an APSO format, for additional subjective and objective evaluation please scroll to the bottom of the note.    CC:  Chief Complaint   Patient presents with    Gynecologic Exam       Assessment/Plan:  Problem List Items Addressed This Visit       Abnormal vaginal Pap smear     -Pap smear performed today. Will follow up with results.         Relevant Orders    PAP LB, CT-NG, HPV 16&18    Chronic low back pain     -Will refer to physical therapy  -Vascular referral at last visit due to history of PAD, reprinted for patient  -L-spine x-ray 3 views  -Bilateral hip x-rays         Relevant Orders    Referral to Physical Therapy    DX-LUMBAR SPINE-2 OR 3 VIEWS    DX-HIP-BILATERAL-WITH PELVIS-2 VIEWS    DM (diabetes mellitus) ([facility])     -Did not obtain labs due to son breaking his legs this past month.  -Will follow up on lab results at next visit, reprinted lab orders for patient.           Orders Placed This Encounter    DX-LUMBAR SPINE-2 OR 3 VIEWS    DX-HIP-BILATERAL-WITH PELVIS-2 VIEWS    PAP LB, CT-NG, HPV 16&18    Referral to Physical Therapy    metoprolol tartrate (LOPRESSOR) 50 MG Tab       Return in about 3 months (around 10/26/2023) for Lab results.      LABS: No lab results to review at this visit.    HISTORY OF PRESENT ILLNESS: Patient is a 58 y.o. female established patient who presents today with:    Problem   DM (Diabetes Mellitus) ([facility])    Borderline diabetic for years. With dental problems in 2021, progressed to full diabetes and started on metformin. Stopped taking metformin after dental problems resolved and did not follow up.     Abnormal Vaginal Pap Smear    Dysplasia found on Pap smear around 2013, told to follow up every 6 months. No Pap smears since about 2015.     Chronic Low Back Pain    She endorses chronic back pain for decades. Pain from low back radiating down bilateral lower legs, worse at night.  States that she has unequal leg length and this may be contributing to  her pain.         1. Abnormal vaginal Pap smear  PAP LB, CT-NG, HPV 16&18      2. Type 2 diabetes mellitus without complication, without long-term current use of insulin ([facility])        3. Chronic midline low back pain with bilateral sciatica  Referral to Physical Therapy    DX-LUMBAR SPINE-2 OR 3 VIEWS    DX-HIP-BILATERAL-WITH PELVIS-2 VIEWS          Patient Active Problem List    Diagnosis Date Noted    Pain in both lower extremities 03/23/2021    DM (diabetes mellitus) ([facility]) 03/22/2021    Fibromyalgia 05/19/2016    Tobacco abuse 05/19/2016    Abnormal vaginal Pap smear 05/19/2016    Hypercholesterolemia 05/19/2016    Chronic low back pain 05/19/2016    Polyarthralgia 05/19/2016      Allergies:Patient has no known allergies.    Current Outpatient Medications   Medication Sig Dispense Refill    metoprolol tartrate (LOPRESSOR) 50 MG Tab Take 50 mg by mouth 2 times a day.      ezetimibe (ZETIA) 10 MG Tab Take 1 Tablet by mouth at bedtime. 90 Tablet 3    amLODIPine (NORVASC) 5 MG Tab Take 1.5 Tablets by mouth every evening. 30 Tablet 3    aspirin (ASA) 81 MG Chew Tab chewable tablet Chew 1 tablet every day. 30 tablet 1    nicotine (NICODERM) 21 MG/24HR PATCH 24 HR Place 1 Patch on the skin every 24 hours. (Patient not taking: Reported on 7/26/2023) 30 Patch 3     No current facility-administered medications for this visit.       Social History     Tobacco Use    Smoking status: Every Day     Packs/day: 1.00     Years: 45.00     Pack years: 45.00     Types: Cigarettes     Start date: 7/1/1980    Smokeless tobacco: Never   Vaping Use    Vaping Use: Never used   Substance Use Topics    Alcohol use: Not Currently     Comment: QUIT IN 1989    Drug use: Not Currently     Types: Marijuana     Comment: Occasional TCH past 3-4 months, quit July 2023     Social History     Social History Narrative    .   passed away by suicide in the 1990s.  She has 3 children, 2 girls and 1 boy.  Her son was recently hospitalized  "for trauma after a motor vehicle accident.       Family History   Problem Relation Age of Onset    Diabetes Mother     Bipolar disorder Mother     No Known Problems Father     Thyroid Sister     No Known Problems Brother     Diabetes Maternal Aunt     Breast Cancer Maternal Grandmother     Heart Failure Maternal Grandmother     Asthma Maternal Grandmother     Diabetes Maternal Grandfather     No Known Problems Daughter     No Known Problems Son     Diabetes Other     Thyroid Other     Heart Attack Other        Exam:    BP (!) 159/82 (BP Location: Left arm, Patient Position: Sitting, BP Cuff Size: Adult)   Pulse (!) 50   Temp 36.6 °C (97.8 °F) (Temporal)   Resp 16   Ht 1.727 m (5' 8\")   Wt 56.7 kg (125 lb)   LMP 10/04/2011   SpO2 93%   BMI 19.01 kg/m²  Body mass index is 19.01 kg/m².    Gen: Alert and oriented, No apparent distress. Well developed  HEENT: NCAT, MMM  Neck: Supple, FROM  Chest: No deformities, Equal chest expansion  Lungs: Normal effort, CTA bilaterally, no wheezes, rhonchi, or rales  CV: Regular rate and rhythm. No murmurs, rubs, or gallops.  Distal pedal pulses diminished but palpable.  Bilateral posterior tibial pulses intact, unchanged from prior exam.  Abd: Non-distended  :  Normal female genitalia.  No cervical discharge, no cervical motion tenderness.  Chaperone present during female pelvic exam.  Ext: No clubbing, cyanosis, edema.  Skin: Warm/dry, without rashes  Neuro: A/O x 4, CN 2-12 Grossly intact, Motor/sensory grossly intact  Psych: Normal behavior, normal affect      Francisco Anders MD  PGY-1  [facility]     "

## 2023-07-27 LAB
FORWARD REASON: SPWHY: NORMAL
FORWARDED TO LAB: SPWHR: NORMAL
SPECIMEN SENT: SPWT1: NORMAL

## 2023-08-07 DIAGNOSIS — Z87.891 PERSONAL HISTORY OF TOBACCO USE, PRESENTING HAZARDS TO HEALTH: Primary | ICD-10-CM

## 2024-07-11 ENCOUNTER — HOSPITAL ENCOUNTER (EMERGENCY)
Facility: MEDICAL CENTER | Age: 59
End: 2024-07-11
Attending: EMERGENCY MEDICINE
Payer: MEDICARE

## 2024-07-11 VITALS
RESPIRATION RATE: 19 BRPM | BODY MASS INDEX: 19.61 KG/M2 | OXYGEN SATURATION: 99 % | SYSTOLIC BLOOD PRESSURE: 145 MMHG | DIASTOLIC BLOOD PRESSURE: 60 MMHG | WEIGHT: 129.41 LBS | TEMPERATURE: 97.5 F | HEART RATE: 66 BPM | HEIGHT: 68 IN

## 2024-07-11 DIAGNOSIS — M79.604 PAIN IN BOTH LOWER EXTREMITIES: ICD-10-CM

## 2024-07-11 DIAGNOSIS — M79.605 PAIN IN BOTH LOWER EXTREMITIES: ICD-10-CM

## 2024-07-11 DIAGNOSIS — K59.00 CONSTIPATION, UNSPECIFIED CONSTIPATION TYPE: ICD-10-CM

## 2024-07-11 LAB
ALBUMIN SERPL BCP-MCNC: 4.2 G/DL (ref 3.2–4.9)
ALBUMIN/GLOB SERPL: 1.8 G/DL
ALP SERPL-CCNC: 61 U/L (ref 30–99)
ALT SERPL-CCNC: 24 U/L (ref 2–50)
ANION GAP SERPL CALC-SCNC: 15 MMOL/L (ref 7–16)
AST SERPL-CCNC: 30 U/L (ref 12–45)
BASOPHILS # BLD AUTO: 0.6 % (ref 0–1.8)
BASOPHILS # BLD: 0.06 K/UL (ref 0–0.12)
BILIRUB SERPL-MCNC: 0.5 MG/DL (ref 0.1–1.5)
BUN SERPL-MCNC: 11 MG/DL (ref 8–22)
CALCIUM ALBUM COR SERPL-MCNC: 9.4 MG/DL (ref 8.5–10.5)
CALCIUM SERPL-MCNC: 9.6 MG/DL (ref 8.5–10.5)
CHLORIDE SERPL-SCNC: 106 MMOL/L (ref 96–112)
CHOLEST SERPL-MCNC: 144 MG/DL (ref 100–199)
CO2 SERPL-SCNC: 19 MMOL/L (ref 20–33)
CREAT SERPL-MCNC: 0.68 MG/DL (ref 0.5–1.4)
EOSINOPHIL # BLD AUTO: 0.19 K/UL (ref 0–0.51)
EOSINOPHIL NFR BLD: 2 % (ref 0–6.9)
ERYTHROCYTE [DISTWIDTH] IN BLOOD BY AUTOMATED COUNT: 41.3 FL (ref 35.9–50)
EST. AVERAGE GLUCOSE BLD GHB EST-MCNC: 134 MG/DL
GFR SERPLBLD CREATININE-BSD FMLA CKD-EPI: 99 ML/MIN/1.73 M 2
GLOBULIN SER CALC-MCNC: 2.4 G/DL (ref 1.9–3.5)
GLUCOSE SERPL-MCNC: 136 MG/DL (ref 65–99)
HBA1C MFR BLD: 6.3 % (ref 4–5.6)
HCT VFR BLD AUTO: 44.5 % (ref 37–47)
HDLC SERPL-MCNC: 39 MG/DL
HGB BLD-MCNC: 15 G/DL (ref 12–16)
IMM GRANULOCYTES # BLD AUTO: 0.03 K/UL (ref 0–0.11)
IMM GRANULOCYTES NFR BLD AUTO: 0.3 % (ref 0–0.9)
LDLC SERPL CALC-MCNC: 87 MG/DL
LYMPHOCYTES # BLD AUTO: 2.06 K/UL (ref 1–4.8)
LYMPHOCYTES NFR BLD: 21.3 % (ref 22–41)
MCH RBC QN AUTO: 31.1 PG (ref 27–33)
MCHC RBC AUTO-ENTMCNC: 33.7 G/DL (ref 32.2–35.5)
MCV RBC AUTO: 92.1 FL (ref 81.4–97.8)
MONOCYTES # BLD AUTO: 0.71 K/UL (ref 0–0.85)
MONOCYTES NFR BLD AUTO: 7.4 % (ref 0–13.4)
NEUTROPHILS # BLD AUTO: 6.6 K/UL (ref 1.82–7.42)
NEUTROPHILS NFR BLD: 68.4 % (ref 44–72)
NRBC # BLD AUTO: 0 K/UL
NRBC BLD-RTO: 0 /100 WBC (ref 0–0.2)
PLATELET # BLD AUTO: 265 K/UL (ref 164–446)
PMV BLD AUTO: 9.4 FL (ref 9–12.9)
POTASSIUM SERPL-SCNC: 3.5 MMOL/L (ref 3.6–5.5)
PROT SERPL-MCNC: 6.6 G/DL (ref 6–8.2)
RBC # BLD AUTO: 4.83 M/UL (ref 4.2–5.4)
SODIUM SERPL-SCNC: 140 MMOL/L (ref 135–145)
TRIGL SERPL-MCNC: 91 MG/DL (ref 0–149)
WBC # BLD AUTO: 9.7 K/UL (ref 4.8–10.8)

## 2024-07-11 PROCEDURE — 99283 EMERGENCY DEPT VISIT LOW MDM: CPT

## 2024-07-11 PROCEDURE — 700102 HCHG RX REV CODE 250 W/ 637 OVERRIDE(OP): Performed by: EMERGENCY MEDICINE

## 2024-07-11 PROCEDURE — 83036 HEMOGLOBIN GLYCOSYLATED A1C: CPT

## 2024-07-11 PROCEDURE — 80053 COMPREHEN METABOLIC PANEL: CPT

## 2024-07-11 PROCEDURE — 36415 COLL VENOUS BLD VENIPUNCTURE: CPT

## 2024-07-11 PROCEDURE — 80061 LIPID PANEL: CPT

## 2024-07-11 PROCEDURE — A9270 NON-COVERED ITEM OR SERVICE: HCPCS | Performed by: EMERGENCY MEDICINE

## 2024-07-11 PROCEDURE — 85025 COMPLETE CBC W/AUTO DIFF WBC: CPT

## 2024-07-11 RX ORDER — CYCLOBENZAPRINE HCL 10 MG
10 TABLET ORAL 3 TIMES DAILY PRN
Qty: 30 TABLET | Refills: 0 | Status: SHIPPED | OUTPATIENT
Start: 2024-07-11

## 2024-07-11 RX ORDER — CYCLOBENZAPRINE HCL 10 MG
10 TABLET ORAL ONCE
Status: COMPLETED | OUTPATIENT
Start: 2024-07-11 | End: 2024-07-11

## 2024-07-11 RX ORDER — POLYETHYLENE GLYCOL 3350 17 G/17G
17 POWDER, FOR SOLUTION ORAL DAILY
Qty: 30 EACH | Refills: 0 | Status: SHIPPED | OUTPATIENT
Start: 2024-07-11

## 2024-07-11 RX ADMIN — CYCLOBENZAPRINE 10 MG: 10 TABLET, FILM COATED ORAL at 04:41

## 2024-07-11 ASSESSMENT — PAIN DESCRIPTION - PAIN TYPE: TYPE: ACUTE PAIN

## 2024-10-27 NOTE — TELEPHONE ENCOUNTER
Last seen: 03/31/17 by Dr. Pinzon            Next appt: 05/05/17 with Dr. Pinzon    Was the patient seen in the last year in this department? Yes    Does patient have an active prescription for medications requested? No    Received Request Via: Pharmacy   Patient